# Patient Record
Sex: MALE | Race: WHITE | NOT HISPANIC OR LATINO | Employment: OTHER | URBAN - NONMETROPOLITAN AREA
[De-identification: names, ages, dates, MRNs, and addresses within clinical notes are randomized per-mention and may not be internally consistent; named-entity substitution may affect disease eponyms.]

---

## 2021-03-19 ENCOUNTER — HOSPITAL ENCOUNTER (EMERGENCY)
Facility: HOSPITAL | Age: 81
End: 2021-03-20
Attending: EMERGENCY MEDICINE | Admitting: EMERGENCY MEDICINE
Payer: MEDICARE

## 2021-03-19 ENCOUNTER — APPOINTMENT (EMERGENCY)
Dept: CT IMAGING | Facility: HOSPITAL | Age: 81
End: 2021-03-19
Payer: MEDICARE

## 2021-03-19 DIAGNOSIS — N20.1 LEFT URETERAL CALCULUS: Primary | ICD-10-CM

## 2021-03-19 DIAGNOSIS — K57.90 DIVERTICULOSIS: ICD-10-CM

## 2021-03-19 DIAGNOSIS — N40.0 ENLARGED PROSTATE: ICD-10-CM

## 2021-03-19 DIAGNOSIS — I10 HYPERTENSION: ICD-10-CM

## 2021-03-19 DIAGNOSIS — N18.30 CKD (CHRONIC KIDNEY DISEASE) STAGE 3, GFR 30-59 ML/MIN (HCC): ICD-10-CM

## 2021-03-19 DIAGNOSIS — N13.30 HYDROURETERONEPHROSIS: ICD-10-CM

## 2021-03-19 LAB
ALBUMIN SERPL BCP-MCNC: 4.2 G/DL (ref 3.5–5)
ALP SERPL-CCNC: 76 U/L (ref 46–116)
ALT SERPL W P-5'-P-CCNC: 28 U/L (ref 12–78)
ANION GAP SERPL CALCULATED.3IONS-SCNC: 10 MMOL/L (ref 4–13)
APTT PPP: 26 SECONDS (ref 23–37)
AST SERPL W P-5'-P-CCNC: 21 U/L (ref 5–45)
BACTERIA UR QL AUTO: NORMAL /HPF
BASOPHILS # BLD AUTO: 0.01 THOUSANDS/ΜL (ref 0–0.1)
BASOPHILS NFR BLD AUTO: 0 % (ref 0–1)
BILIRUB SERPL-MCNC: 0.4 MG/DL (ref 0.2–1)
BILIRUB UR QL STRIP: NEGATIVE
BUN SERPL-MCNC: 28 MG/DL (ref 5–25)
CALCIUM SERPL-MCNC: 9.2 MG/DL (ref 8.3–10.1)
CHLORIDE SERPL-SCNC: 105 MMOL/L (ref 100–108)
CLARITY UR: CLEAR
CO2 SERPL-SCNC: 25 MMOL/L (ref 21–32)
COLOR UR: YELLOW
CREAT SERPL-MCNC: 1.5 MG/DL (ref 0.6–1.3)
D DIMER PPP FEU-MCNC: 0.77 UG/ML FEU
EOSINOPHIL # BLD AUTO: 0.02 THOUSAND/ΜL (ref 0–0.61)
EOSINOPHIL NFR BLD AUTO: 0 % (ref 0–6)
ERYTHROCYTE [DISTWIDTH] IN BLOOD BY AUTOMATED COUNT: 13.3 % (ref 11.6–15.1)
GFR SERPL CREATININE-BSD FRML MDRD: 44 ML/MIN/1.73SQ M
GLUCOSE SERPL-MCNC: 148 MG/DL (ref 65–140)
GLUCOSE UR STRIP-MCNC: NEGATIVE MG/DL
HCT VFR BLD AUTO: 38.4 % (ref 36.5–49.3)
HGB BLD-MCNC: 12.4 G/DL (ref 12–17)
HGB UR QL STRIP.AUTO: ABNORMAL
IMM GRANULOCYTES # BLD AUTO: 0.03 THOUSAND/UL (ref 0–0.2)
IMM GRANULOCYTES NFR BLD AUTO: 0 % (ref 0–2)
INR PPP: 1.02 (ref 0.84–1.19)
KETONES UR STRIP-MCNC: NEGATIVE MG/DL
LACTATE SERPL-SCNC: 0.7 MMOL/L (ref 0.5–2)
LEUKOCYTE ESTERASE UR QL STRIP: NEGATIVE
LIPASE SERPL-CCNC: 94 U/L (ref 73–393)
LYMPHOCYTES # BLD AUTO: 1.06 THOUSANDS/ΜL (ref 0.6–4.47)
LYMPHOCYTES NFR BLD AUTO: 12 % (ref 14–44)
MCH RBC QN AUTO: 30.7 PG (ref 26.8–34.3)
MCHC RBC AUTO-ENTMCNC: 32.3 G/DL (ref 31.4–37.4)
MCV RBC AUTO: 95 FL (ref 82–98)
MONOCYTES # BLD AUTO: 0.58 THOUSAND/ΜL (ref 0.17–1.22)
MONOCYTES NFR BLD AUTO: 7 % (ref 4–12)
NEUTROPHILS # BLD AUTO: 6.94 THOUSANDS/ΜL (ref 1.85–7.62)
NEUTS SEG NFR BLD AUTO: 81 % (ref 43–75)
NITRITE UR QL STRIP: NEGATIVE
NON-SQ EPI CELLS URNS QL MICRO: NORMAL /HPF
NRBC BLD AUTO-RTO: 0 /100 WBCS
PH UR STRIP.AUTO: 5.5 [PH]
PLATELET # BLD AUTO: 184 THOUSANDS/UL (ref 149–390)
PMV BLD AUTO: 11 FL (ref 8.9–12.7)
POTASSIUM SERPL-SCNC: 4.4 MMOL/L (ref 3.5–5.3)
PROT SERPL-MCNC: 7.6 G/DL (ref 6.4–8.2)
PROT UR STRIP-MCNC: NEGATIVE MG/DL
PROTHROMBIN TIME: 13.2 SECONDS (ref 11.6–14.5)
RBC # BLD AUTO: 4.04 MILLION/UL (ref 3.88–5.62)
RBC #/AREA URNS AUTO: NORMAL /HPF
SODIUM SERPL-SCNC: 140 MMOL/L (ref 136–145)
SP GR UR STRIP.AUTO: 1.02 (ref 1–1.03)
TROPONIN I SERPL-MCNC: <0.02 NG/ML
UROBILINOGEN UR QL STRIP.AUTO: 0.2 E.U./DL
WBC # BLD AUTO: 8.64 THOUSAND/UL (ref 4.31–10.16)
WBC #/AREA URNS AUTO: NORMAL /HPF

## 2021-03-19 PROCEDURE — 80053 COMPREHEN METABOLIC PANEL: CPT | Performed by: PHYSICIAN ASSISTANT

## 2021-03-19 PROCEDURE — 84484 ASSAY OF TROPONIN QUANT: CPT | Performed by: PHYSICIAN ASSISTANT

## 2021-03-19 PROCEDURE — 36415 COLL VENOUS BLD VENIPUNCTURE: CPT | Performed by: PHYSICIAN ASSISTANT

## 2021-03-19 PROCEDURE — 96374 THER/PROPH/DIAG INJ IV PUSH: CPT

## 2021-03-19 PROCEDURE — 81001 URINALYSIS AUTO W/SCOPE: CPT | Performed by: PHYSICIAN ASSISTANT

## 2021-03-19 PROCEDURE — 93005 ELECTROCARDIOGRAM TRACING: CPT

## 2021-03-19 PROCEDURE — 83605 ASSAY OF LACTIC ACID: CPT | Performed by: PHYSICIAN ASSISTANT

## 2021-03-19 PROCEDURE — 85379 FIBRIN DEGRADATION QUANT: CPT | Performed by: PHYSICIAN ASSISTANT

## 2021-03-19 PROCEDURE — 96376 TX/PRO/DX INJ SAME DRUG ADON: CPT

## 2021-03-19 PROCEDURE — 99285 EMERGENCY DEPT VISIT HI MDM: CPT

## 2021-03-19 PROCEDURE — 85730 THROMBOPLASTIN TIME PARTIAL: CPT | Performed by: PHYSICIAN ASSISTANT

## 2021-03-19 PROCEDURE — G1004 CDSM NDSC: HCPCS

## 2021-03-19 PROCEDURE — 96375 TX/PRO/DX INJ NEW DRUG ADDON: CPT

## 2021-03-19 PROCEDURE — 83690 ASSAY OF LIPASE: CPT | Performed by: PHYSICIAN ASSISTANT

## 2021-03-19 PROCEDURE — 74176 CT ABD & PELVIS W/O CONTRAST: CPT

## 2021-03-19 PROCEDURE — 99285 EMERGENCY DEPT VISIT HI MDM: CPT | Performed by: PHYSICIAN ASSISTANT

## 2021-03-19 PROCEDURE — 85025 COMPLETE CBC W/AUTO DIFF WBC: CPT | Performed by: PHYSICIAN ASSISTANT

## 2021-03-19 PROCEDURE — 85610 PROTHROMBIN TIME: CPT | Performed by: PHYSICIAN ASSISTANT

## 2021-03-19 RX ORDER — MORPHINE SULFATE 4 MG/ML
4 INJECTION, SOLUTION INTRAMUSCULAR; INTRAVENOUS ONCE
Status: COMPLETED | OUTPATIENT
Start: 2021-03-19 | End: 2021-03-19

## 2021-03-19 RX ORDER — HYDROMORPHONE HCL/PF 1 MG/ML
0.5 SYRINGE (ML) INJECTION ONCE
Status: COMPLETED | OUTPATIENT
Start: 2021-03-19 | End: 2021-03-19

## 2021-03-19 RX ORDER — SILODOSIN 8 MG/1
CAPSULE ORAL
COMMUNITY

## 2021-03-19 RX ORDER — OMEPRAZOLE 20 MG/1
20 CAPSULE, DELAYED RELEASE ORAL DAILY
COMMUNITY

## 2021-03-19 RX ORDER — LOSARTAN POTASSIUM 25 MG/1
25 TABLET ORAL DAILY
COMMUNITY

## 2021-03-19 RX ORDER — MELATONIN
1000 DAILY
COMMUNITY

## 2021-03-19 RX ORDER — ACETAMINOPHEN 325 MG/1
650 TABLET ORAL ONCE
Status: COMPLETED | OUTPATIENT
Start: 2021-03-19 | End: 2021-03-19

## 2021-03-19 RX ORDER — HYDROMORPHONE HCL/PF 1 MG/ML
0.5 SYRINGE (ML) INJECTION EVERY 2 HOUR PRN
Status: COMPLETED | OUTPATIENT
Start: 2021-03-19 | End: 2021-03-20

## 2021-03-19 RX ORDER — ONDANSETRON 2 MG/ML
4 INJECTION INTRAMUSCULAR; INTRAVENOUS ONCE
Status: COMPLETED | OUTPATIENT
Start: 2021-03-19 | End: 2021-03-19

## 2021-03-19 RX ORDER — TADALAFIL 5 MG/1
5 TABLET ORAL DAILY PRN
COMMUNITY

## 2021-03-19 RX ORDER — CEFAZOLIN SODIUM 2 G/50ML
2000 SOLUTION INTRAVENOUS
Status: DISCONTINUED | OUTPATIENT
Start: 2021-03-20 | End: 2021-03-20 | Stop reason: HOSPADM

## 2021-03-19 RX ORDER — UBIDECARENONE 75 MG
CAPSULE ORAL DAILY
COMMUNITY

## 2021-03-19 RX ADMIN — HYDROMORPHONE HYDROCHLORIDE 0.5 MG: 1 INJECTION, SOLUTION INTRAMUSCULAR; INTRAVENOUS; SUBCUTANEOUS at 18:34

## 2021-03-19 RX ADMIN — ACETAMINOPHEN 650 MG: 325 TABLET, FILM COATED ORAL at 15:08

## 2021-03-19 RX ADMIN — MORPHINE SULFATE 4 MG: 4 INJECTION INTRAVENOUS at 17:21

## 2021-03-19 RX ADMIN — HYDROMORPHONE HYDROCHLORIDE 0.5 MG: 1 INJECTION, SOLUTION INTRAMUSCULAR; INTRAVENOUS; SUBCUTANEOUS at 21:06

## 2021-03-19 RX ADMIN — ONDANSETRON 4 MG: 2 INJECTION INTRAMUSCULAR; INTRAVENOUS at 17:21

## 2021-03-19 NOTE — ED NOTES
LEYDA Johnson made aware of patient's blood pressure 197/84     Maria Eugenia Gutierrez, RN  03/19/21 1389

## 2021-03-19 NOTE — ED NOTES
Pt agreeable to take iv pain medication  Provider aware       Terrence Virgen, JANICE  03/19/21 2895

## 2021-03-19 NOTE — ED NOTES
Patient is aware that  time is for 20:30  Something for pain was also ordered        Shea Salinas, RN  03/19/21 5193

## 2021-03-19 NOTE — ED PROVIDER NOTES
History  Chief Complaint   Patient presents with    Abdominal Pain     Having left sided abdominal pain/rib pain since about 12:00 no N/V/D    Flank Pain     Woke up with left sided flank pain this morning  Has hx of kidney stones  68year old male presents ambulatory from home for evaluation of flank and abdominal pain  Pt notes he woke up today with pain in his left flank  He notes since around noon this pain has radiated around his side to his left upper abdomen  He reports pain under his ribs on the left  Denies chest pain or SOB but notes the pain takes his breath away when it gets intense  It has been present since onset but varies in intensity  Denies fever, chills, cough or congestion  Denies N/V/D  Admits to some recent constipation  Denies frequency, urgency, dysuria, hematuria  Pt notes this pain feels similar to prior kidney stones  No reported aggravating or alleviating factors  At times pain becomes very intense  No specific treatments tried  PMH includes CAD, HTN and kidney stones        History provided by:  Patient   used: No    Abdominal Pain  Pain location:  L flank  Pain quality: aching    Pain radiates to:  LUQ  Onset quality:  Sudden  Timing:  Intermittent  Chronicity:  New  Context: not eating, not recent illness, not recent travel, not retching, not sick contacts, not suspicious food intake and not trauma    Relieved by:  None tried  Worsened by:  Nothing  Associated symptoms: no chest pain, no chills, no constipation, no cough, no diarrhea, no dysuria, no fatigue, no fever, no hematuria, no nausea, no shortness of breath, no sore throat and no vomiting    Risk factors: being elderly    Risk factors: has not had multiple surgeries and no recent hospitalization    Flank Pain  Associated symptoms: no chest pain, no chills, no constipation, no cough, no diarrhea, no dysuria, no fatigue, no fever, no hematuria, no nausea, no shortness of breath, no sore throat and no vomiting        Prior to Admission Medications   Prescriptions Last Dose Informant Patient Reported? Taking? Silodosin (Rapaflo) 8 MG CAPS 3/19/2021 at Unknown time  Yes Yes   Sig: Take by mouth   aspirin (ECOTRIN) 325 mg EC tablet 3/19/2021 at Unknown time  Yes Yes   Sig: Take 325 mg by mouth every 6 (six) hours as needed for mild pain   cholecalciferol (VITAMIN D3) 1,000 units tablet 3/19/2021 at Unknown time  Yes Yes   Sig: Take 1,000 Units by mouth daily   cyanocobalamin (VITAMIN B-12) 100 mcg tablet 3/19/2021 at Unknown time  Yes Yes   Sig: Take by mouth daily   losartan (COZAAR) 25 mg tablet 3/19/2021 at Unknown time  Yes Yes   Sig: Take 25 mg by mouth daily   omeprazole (PriLOSEC) 20 mg delayed release capsule 3/19/2021 at Unknown time  Yes Yes   Sig: Take 20 mg by mouth daily   tadalafil (CIALIS) 5 MG tablet 3/18/2021 at Unknown time  Yes Yes   Sig: Take 5 mg by mouth daily as needed for erectile dysfunction      Facility-Administered Medications: None       Past Medical History:   Diagnosis Date    Coronary artery disease     Hypertension     Kidney stones        Past Surgical History:   Procedure Laterality Date    CARDIAC SURGERY      CHOLECYSTECTOMY      CORONARY ARTERY BYPASS GRAFT      TONSILLECTOMY         History reviewed  No pertinent family history  I have reviewed and agree with the history as documented  E-Cigarette/Vaping    E-Cigarette Use Never User      E-Cigarette/Vaping Substances     Social History     Tobacco Use    Smoking status: Never Smoker    Smokeless tobacco: Never Used   Substance Use Topics    Alcohol use: Yes    Drug use: Never       Review of Systems   Constitutional: Negative  Negative for chills, fatigue and fever  HENT: Negative  Negative for congestion, rhinorrhea and sore throat  Eyes: Negative  Negative for visual disturbance  Respiratory: Negative  Negative for cough, shortness of breath and wheezing      Cardiovascular: Negative  Negative for chest pain, palpitations and leg swelling  Gastrointestinal: Positive for abdominal pain  Negative for constipation, diarrhea, nausea and vomiting  Genitourinary: Positive for flank pain  Negative for decreased urine volume, dysuria, frequency and hematuria  Musculoskeletal: Positive for back pain  Negative for myalgias and neck pain  Skin: Negative  Negative for rash  Neurological: Negative  Negative for dizziness, light-headedness, numbness and headaches  Psychiatric/Behavioral: Negative  Negative for confusion  All other systems reviewed and are negative  Physical Exam  Physical Exam  Vitals signs and nursing note reviewed  Constitutional:       General: He is not in acute distress  Appearance: Normal appearance  He is well-developed  He is not toxic-appearing or diaphoretic  HENT:      Head: Normocephalic and atraumatic  Right Ear: Hearing and external ear normal       Left Ear: Hearing and external ear normal       Nose: Nose normal       Mouth/Throat:      Pharynx: Oropharynx is clear  Uvula midline  No oropharyngeal exudate  Eyes:      General: No scleral icterus  Extraocular Movements: Extraocular movements intact  Conjunctiva/sclera: Conjunctivae normal       Pupils: Pupils are equal, round, and reactive to light  Neck:      Musculoskeletal: Normal range of motion and neck supple  Trachea: Trachea and phonation normal  No tracheal deviation  Cardiovascular:      Rate and Rhythm: Normal rate and regular rhythm  Pulses: Normal pulses  Heart sounds: Normal heart sounds, S1 normal and S2 normal  No murmur  Pulmonary:      Effort: Pulmonary effort is normal  No tachypnea or respiratory distress  Breath sounds: Normal breath sounds  No wheezing, rhonchi or rales  Abdominal:      General: Bowel sounds are normal  There is no distension  Palpations: Abdomen is soft  Tenderness:  There is abdominal tenderness in the left upper quadrant  There is no right CVA tenderness, left CVA tenderness, guarding or rebound  Hernia: No hernia is present  Musculoskeletal: Normal range of motion  General: No tenderness  Right lower leg: No edema  Left lower leg: No edema  Skin:     General: Skin is warm and dry  Capillary Refill: Capillary refill takes less than 2 seconds  Findings: No rash  Neurological:      General: No focal deficit present  Mental Status: He is alert and oriented to person, place, and time  GCS: GCS eye subscore is 4  GCS verbal subscore is 5  GCS motor subscore is 6  Cranial Nerves: No cranial nerve deficit  Sensory: No sensory deficit  Motor: No abnormal muscle tone        Gait: Gait normal    Psychiatric:         Mood and Affect: Mood normal          Speech: Speech normal          Behavior: Behavior normal          Vital Signs  ED Triage Vitals [03/19/21 1417]   Temperature Pulse Respirations Blood Pressure SpO2   97 5 °F (36 4 °C) 62 16 (!) 187/82 98 %      Temp Source Heart Rate Source Patient Position - Orthostatic VS BP Location FiO2 (%)   Temporal Monitor Sitting Right arm --      Pain Score       8           Vitals:    03/19/21 1930 03/19/21 1940 03/19/21 2000 03/19/21 2030   BP:  168/77 154/72 (!) 175/70   Pulse: 70 66 70 60   Patient Position - Orthostatic VS:             Visual Acuity      ED Medications  Medications   ceFAZolin (ANCEF) IVPB (premix in dextrose) 2,000 mg 50 mL (has no administration in time range)   HYDROmorphone (DILAUDID) injection 0 5 mg (has no administration in time range)   ondansetron (ZOFRAN) injection 4 mg (4 mg Intravenous Given 3/19/21 1721)   morphine (PF) 4 mg/mL injection 4 mg (4 mg Intravenous Given 3/19/21 1721)   acetaminophen (TYLENOL) tablet 650 mg (650 mg Oral Given 3/19/21 1508)   HYDROmorphone (DILAUDID) injection 0 5 mg (0 5 mg Intravenous Given 3/19/21 1834)       Diagnostic Studies  Results Reviewed Procedure Component Value Units Date/Time    Urine Microscopic [[id number]]  (Normal) Collected: 03/19/21 1553    Lab Status: Final result Specimen: Urine, Clean Catch Updated: 03/19/21 1618     RBC, UA 2-4 /hpf      WBC, UA None Seen /hpf      Epithelial Cells Occasional /hpf      Bacteria, UA Occasional /hpf     UA (URINE) with reflex to Scope [[id number]]  (Abnormal) Collected: 03/19/21 1553    Lab Status: Final result Specimen: Urine, Clean Catch Updated: 03/19/21 1559     Color, UA Yellow     Clarity, UA Clear     Specific Gravity, UA 1 025     pH, UA 5 5     Leukocytes, UA Negative     Nitrite, UA Negative     Protein, UA Negative mg/dl      Glucose, UA Negative mg/dl      Ketones, UA Negative mg/dl      Urobilinogen, UA 0 2 E U /dl      Bilirubin, UA Negative     Blood, UA Small    Troponin I [[id number]]  (Normal) Collected: 03/19/21 1432    Lab Status: Final result Specimen: Blood from Arm, Right Updated: 03/19/21 1457     Troponin I <0 02 ng/mL     Lactic acid [[id number]]  (Normal) Collected: 03/19/21 1432    Lab Status: Final result Specimen: Blood from Arm, Right Updated: 03/19/21 1457     LACTIC ACID 0 7 mmol/L     Narrative:      Result may be elevated if tourniquet was used during collection      D-Dimer [[id number]]  (Abnormal) Collected: 03/19/21 1432    Lab Status: Final result Specimen: Blood from Arm, Right Updated: 03/19/21 1456     D-Dimer, Quant 0 77 ug/ml FEU     Comprehensive metabolic panel [[id number]]  (Abnormal) Collected: 03/19/21 1432    Lab Status: Final result Specimen: Blood from Arm, Right Updated: 03/19/21 1455     Sodium 140 mmol/L      Potassium 4 4 mmol/L      Chloride 105 mmol/L      CO2 25 mmol/L      ANION GAP 10 mmol/L      BUN 28 mg/dL      Creatinine 1 50 mg/dL      Glucose 148 mg/dL      Calcium 9 2 mg/dL      AST 21 U/L      ALT 28 U/L      Alkaline Phosphatase 76 U/L      Total Protein 7 6 g/dL      Albumin 4 2 g/dL      Total Bilirubin 0 40 mg/dL      eGFR 44 ml/min/1 73sq m     Narrative:      National Kidney Disease Foundation guidelines for Chronic Kidney Disease (CKD):     Stage 1 with normal or high GFR (GFR > 90 mL/min/1 73 square meters)    Stage 2 Mild CKD (GFR = 60-89 mL/min/1 73 square meters)    Stage 3A Moderate CKD (GFR = 45-59 mL/min/1 73 square meters)    Stage 3B Moderate CKD (GFR = 30-44 mL/min/1 73 square meters)    Stage 4 Severe CKD (GFR = 15-29 mL/min/1 73 square meters)    Stage 5 End Stage CKD (GFR <15 mL/min/1 73 square meters)  Note: GFR calculation is accurate only with a steady state creatinine    Lipase [[id number]]  (Normal) Collected: 03/19/21 1432    Lab Status: Final result Specimen: Blood from Arm, Right Updated: 03/19/21 1448     Lipase 94 u/L     Protime-INR [[id number]]  (Normal) Collected: 03/19/21 1432    Lab Status: Final result Specimen: Blood from Arm, Right Updated: 03/19/21 1447     Protime 13 2 seconds      INR 1 02    APTT [[id number]]  (Normal) Collected: 03/19/21 1432    Lab Status: Final result Specimen: Blood from Arm, Right Updated: 03/19/21 1447     PTT 26 seconds     CBC and differential [[id number]]  (Abnormal) Collected: 03/19/21 1432    Lab Status: Final result Specimen: Blood from Arm, Right Updated: 03/19/21 1439     WBC 8 64 Thousand/uL      RBC 4 04 Million/uL      Hemoglobin 12 4 g/dL      Hematocrit 38 4 %      MCV 95 fL      MCH 30 7 pg      MCHC 32 3 g/dL      RDW 13 3 %      MPV 11 0 fL      Platelets 413 Thousands/uL      nRBC 0 /100 WBCs      Neutrophils Relative 81 %      Immat GRANS % 0 %      Lymphocytes Relative 12 %      Monocytes Relative 7 %      Eosinophils Relative 0 %      Basophils Relative 0 %      Neutrophils Absolute 6 94 Thousands/µL      Immature Grans Absolute 0 03 Thousand/uL      Lymphocytes Absolute 1 06 Thousands/µL      Monocytes Absolute 0 58 Thousand/µL      Eosinophils Absolute 0 02 Thousand/µL      Basophils Absolute 0 01 Thousands/µL                  CT renal stone study abdomen pelvis without contrast   Final Result by Ray Kline MD (03/19 9006)      Proximal left obstructing 1 4 cm ureteral calculus just beyond the renal pelvis causing moderate left hydroureteronephrosis  Numerous other subcentimeter left renal calculi are identified largest in the lower pole measuring 8 mm  Left renal simple attenuating cyst and left perinephric stranding could represent edema rather than infection, correlate with urinalysis to exclude urinary tract infection  Enlarged prostate protruding into the bladder base  Colonic diverticulosis  Workstation performed: [id number]                    Procedures  ECG 12 Lead Documentation Only    Date/Time: 3/19/2021 2:36 PM  Performed by: Suresh Rao PA-C  Authorized by: Suresh Rao PA-C     Indications / Diagnosis:  Abd pain  ECG reviewed by me, the ED Provider: yes    Patient location:  ED  Previous ECG:     Previous ECG:  Unavailable    Comparison to cardiac monitor: Yes    Interpretation:     Interpretation: abnormal    Rate:     ECG rate:  60    ECG rate assessment: normal    Rhythm:     Rhythm: sinus rhythm    Ectopy:     Ectopy: none    QRS:     QRS axis:  Normal    QRS intervals:  Normal  Conduction:     Conduction: normal    ST segments:     ST segments:  Normal  T waves:     T waves: non-specific and inverted      Inverted:  III and V1  Comments:      , QRS 82, QT/QTc 400/400; no acute ischemia  ED Course  ED Course as of Mar 19 2100   Fri Mar 19, 2021   1436 Pt declines the morphine that was ordered and questions if he can have something else such as aspirin or tylenol        1439 WBC: 8 64   1439 Hemoglobin: 12 4   1439 Platelet Count: 928   1458 Glucose, Random(!): 148   1458 Similar to prior outpatient labs in Dec 2020 (1 51)   Creatinine(!): 1 50   1459 BUN(!): 28   1459 Sodium: 140   1459 Potassium: 4 4   1459 Chloride: 105   1459 CO2: 25   1459 Anion Gap: 10   1459 Calcium: 9 2 1459 AST: 21   1459 ALT: 28   1459 Alkaline Phosphatase: 76   1459 Total Protein: 7 6   1459 Albumin: 4 2   1459 TOTAL BILIRUBIN: 0 40   1459 eGFR: 44   1459 INR: 1 02   1459 Protime: 13 2   1459 PTT: 26   1459 Lipase: 94   1459 Troponin I: <0 02   1459 LACTIC ACID: 0 7   1459 Age corrected is within normal limits  D-Dimer, Quant(!): 0 77   1529 CT performed and pending interpretation  1547 IMPRESSION:     Proximal left obstructing 1 4 cm ureteral calculus just beyond the renal pelvis causing moderate left hydroureteronephrosis  Numerous other subcentimeter left renal calculi are identified largest in the lower pole measuring 8 mm        Left renal simple attenuating cyst and left perinephric stranding could represent edema rather than infection, correlate with urinalysis to exclude urinary tract infection      Enlarged prostate protruding into the bladder base      Colonic diverticulosis  CT renal stone study abdomen pelvis without contrast   1555 Tiger text sent to on call urology Snehal Uribe to discuss  36 Spoke with pt and family  Requests that I call his urologist that he follows with in [place]  [id number] Phone # [phone]  Dr Magi Vora      1600 Called placed through answering service [facility] urology  Will page out to his physician Dr Magi Vora  1600 [facility] J small blood but otherwise no evidence of infection  UA (URINE) with reflex to Scope(!)   1630 WBC, UA: None Seen   1630 RBC, UA: 2-4   1640 I still have not heard back from pt's urologist whom was paged through his answering service  1650 I again spoke with pt  He reports his pain is getting worse but declines the morphine that was ordered as he doesn't want anything that strong  I did discuss that I still haven't heard from his specialist and asked him how he would like to proceed  He doesn't feel he could go home feeling like this and is agreeable to transfer to [facility] for urology consult and intervention  760 [facility] [place] transfer request       8079 Again spoke to urology NP  Will add on scheduled for tomorrow  Recommends transfer to [facility]  56 Pt now agreeable to the morphine that was previously ordered  1735 Received return call from PACS; spoke with Dr Gillespie Side of [facility], appropriate pt information relayed and accepted in transfer  1751 ETA  for transport scheduled for 8:30 pm       1819 A resident working with Dr Laurent Champ returned phone call  I relayed information and plan for transfer which he agrees is appropriate to have pt acutely taken care of  He asked to speak to pt and we were able to connect him via telephone  1828 Pt reports pain returning  Currently rates it 12/10  Is agreeable to additional medication  1854 Pt and spouse agreeable to transfer/admission plan  1921 Received call from PACS  ETA for  has been pushed back to 2245 1951 Pt has had elevated BP readings here, however did improve with treatment of his pain  He remains comfortable and latest /77       2033 Received another call from PACS regarding transportation   time now 00:30  Pt care endorsed to on coming night time physician Dr Heart Screen due to shift change  Pt is pending transfer to [facility] for urological intervention which is planned tomorrow  I have order additional pain medication which can be given as needed  Pt afebrile and hemodynamically stable at time of my departure        HEART Risk Score      Most Recent Value   Heart Score Risk Calculator   History  0 Filed at: 03/19/2021 1440   ECG  1 Filed at: 03/19/2021 1440   Age  2 Filed at: 03/19/2021 1440   Risk Factors  2 Filed at: 03/19/2021 1440   Troponin  0 Filed at: 03/19/2021 1440   HEART Score  5 Filed at: 03/19/2021 1440                      SBIRT 20yo+      Most Recent Value   SBIRT (25 yo +)   In order to provide better care to our patients, we are screening all of our patients for alcohol and drug use  Would it be okay to ask you these screening questions? Yes Filed at: 03/19/2021 1423   Initial Alcohol Screen: US AUDIT-C    1  How often do you have a drink containing alcohol?  0 Filed at: 03/19/2021 1423   2  How many drinks containing alcohol do you have on a typical day you are drinking? 0 Filed at: 03/19/2021 1423   3a  Male UNDER 65: How often do you have five or more drinks on one occasion? 0 Filed at: 03/19/2021 1423   3b  FEMALE Any Age, or MALE 65+: How often do you have 4 or more drinks on one occassion? 0 Filed at: 03/19/2021 1423   Audit-C Score  0 Filed at: 03/19/2021 1423   GODWIN: How many times in the past year have you    Used an illegal drug or used a prescription medication for non-medical reasons?   Never Filed at: 03/19/2021 1423          Wells' Criteria for PE      Most Recent Value   Wells' Criteria for PE   Clinical signs and symptoms of DVT  0 Filed at: 03/19/2021 1458   PE is primary diagnosis or equally likely  0 Filed at: 03/19/2021 1458   HR >100  0 Filed at: 03/19/2021 1458   Immobilization at least 3 days or Surgery in the previous 4 weeks  0 Filed at: 03/19/2021 1458   Previous, objectively diagnosed PE or DVT  0 Filed at: 03/19/2021 1458   Hemoptysis  0 Filed at: 03/19/2021 1458   Malignancy with treatment within 6 months or palliative  0 Filed at: 03/19/2021 1458   Wells' Criteria Total  0 Filed at: 03/19/2021 1458                MDM  Number of Diagnoses or Management Options  CKD (chronic kidney disease) stage 3, GFR 30-59 ml/min:   Diverticulosis:   Enlarged prostate:   Hydroureteronephrosis: new and requires workup  Hypertension: established and worsening  Left ureteral calculus: new and requires workup     Amount and/or Complexity of Data Reviewed  Clinical lab tests: ordered and reviewed  Tests in the radiology section of CPT®: ordered and reviewed  Decide to obtain previous medical records or to obtain history from someone other than the patient: yes  Obtain history from someone other than the patient: yes  Review and summarize past medical records: yes  Discuss the patient with other providers: yes (Attending, SLIM, urology)  Independent visualization of images, tracings, or specimens: yes    Patient Progress  Patient progress: improved      Disposition  Final diagnoses:   Left ureteral calculus   Hydroureteronephrosis   CKD (chronic kidney disease) stage 3, GFR 30-59 ml/min   Enlarged prostate   Diverticulosis   Hypertension     Time reflects when diagnosis was documented in both MDM as applicable and the Disposition within this note     Time User Action Codes Description Comment    3/19/2021  4:11 PM Terry [place] Add [N20 1] Left ureteral calculus     3/19/2021  4:12 PM Terry [place] Add [N13 30] Hydroureteronephrosis     3/19/2021  4:12 PM Terry [place] Add [N18 30] CKD (chronic kidney disease) stage 3, GFR 30-59 ml/min     3/19/2021  4:13 PM Terry [place] Add [N40 0] Enlarged prostate     3/19/2021  4:13 PM Caroline Menjivar [K57 90] Diverticulosis     3/19/2021  5:11 PM Junior 83Angélica [address] Hypertension       ED Disposition     ED Disposition Condition Date/Time Comment    Transfer to Another Facility-In Network  Fri Mar 19, 2021  5:11 PM Nile Sers should be transferred out to [facility]          MD Documentation      Most Recent Value   Patient Condition  The patient has been stabilized such that within reasonable medical probability, no material deterioration of the patient condition or the condition of the unborn child(kevni) is likely to result from the transfer   Reason for Transfer  Level of Care needed not available at this facility   Benefits of Transfer  Specialized equipment and/or services available at the receiving facility (Include comment)________________________ [urology]   Risks of Transfer  Potential for delay in receiving treatment, Loss of IV, Potential deterioration of medical condition, Increased discomfort during transfer, Possible worsening of condition or death during transfer   Accepting Physician  [address] Name, [address] [place] [facility]    (Name & Tel number)  PACS   Sending MD COLLETTE CRABTREE   Provider Certification  General risk, such as traffic hazards, adverse weather conditions, rough terrain or turbulence, possible failure of equipment (including vehicle or aircraft), or consequences of actions of persons outside the control of the transport personnel, Unanticipated needs of medical equipment and personnel during transport, The possibility of a transport vehicle being unavailable, Risk of worsening condition      RN Documentation      [address] Name, [address] [place] Fermin [facility]   Bed Assignment  [id number]    (Name & Tel number)  PACS      Follow-up Information    None         Patient's Medications   Discharge Prescriptions    No medications on file     No discharge procedures on file      PDMP Review       Value Time User    PDMP Reviewed  Yes 3/19/2021  6:28 PM Sam Chapin PA-C          ED Provider  Electronically Signed by           Sam Chapin PA-C  03/19/21 2100

## 2021-03-19 NOTE — ED NOTES
Pt returned from [address] [place] [address] 60 at this time     August Daugherty RN  03/19/21 7608

## 2021-03-19 NOTE — ED NOTES
KAUSHAL new  time is 22:00  Parveen Schaeffer PA-C aware        Deyvi Redmond, JANICE  03/19/21 8210

## 2021-03-19 NOTE — EMTALA/ACUTE CARE TRANSFER
[address]  [address] [place] [id number]  Dept: [phone]      EMTALA TRANSFER CONSENT    NAME Amarilis Ivory 1940                              MRN [id number]    I have been informed of my rights regarding examination, treatment, and transfer   by Dr Nemesio Klein No att  providers found    Benefits: Specialized equipment and/or services available at the receiving facility (Include comment)________________________(urology)    Risks: Potential for delay in receiving treatment, Loss of IV, Potential deterioration of medical condition, Increased discomfort during transfer, Possible worsening of condition or death during transfer      Consent for Transfer:  I acknowledge that my medical condition has been evaluated and explained to me by the emergency department physician or other qualified medical person and/or my attending physician, who has recommended that I be transferred to the service of  Accepting Physician: Dr Tanja Iglesias at [address] [facility] Name, [address] : [facility]  The above potential benefits of such transfer, the potential risks associated with such transfer, and the probable risks of not being transferred have been explained to me, and I fully understand them  The doctor has explained that, in my case, the benefits of transfer outweigh the risks  I agree to be transferred  I authorize the performance of emergency medical procedures and treatments upon me in both transit and upon arrival at the receiving facility  Additionally, I authorize the release of any and all medical records to the receiving facility and request they be transported with me, if possible  I understand that the safest mode of transportation during a medical emergency is an ambulance and that the Hospital advocates the use of this mode of transport   Risks of traveling to the receiving facility by car, including absence of medical control, life sustaining equipment, such as oxygen, and medical personnel has been explained to me and I fully understand them  (KATLIN CORRECT BOX BELOW)  [ x ]  I consent to the stated transfer and to be transported by ambulance/helicopter  [  ]  I consent to the stated transfer, but refuse transportation by ambulance and accept full responsibility for my transportation by car  I understand the risks of non-ambulance transfers and I exonerate the Hospital and its staff from any deterioration in my condition that results from this refusal     X___________________________________________    DATE  21  TIME________  Signature of patient or legally responsible individual signing on patient behalf           RELATIONSHIP TO PATIENT_________________________          Provider Certification    NAME Des Humphrey                                        [facility] 1940                              MRN [id number]    A medical screening exam was performed on the above named patient  Based on the examination:    Condition Necessitating Transfer The primary encounter diagnosis was Left ureteral calculus  Diagnoses of Hydroureteronephrosis, CKD (chronic kidney disease) stage 3, GFR 30-59 ml/min, Enlarged prostate, Diverticulosis, and Hypertension were also pertinent to this visit      Patient Condition: The patient has been stabilized such that within reasonable medical probability, no material deterioration of the patient condition or the condition of the unborn child(kevin) is likely to result from the transfer    Reason for Transfer: Level of Care needed not available at this facility    Transfer Requirements: [facility]   · Space available and qualified personnel available for treatment as acknowledged by PACS  · Agreed to accept transfer and to provide appropriate medical treatment as acknowledged by       Dr Nava Manriquez  · Appropriate medical records of the examination and treatment of the patient are provided at the time of transfer   [facility], [address] _______  · Transfer will be performed by qualified personnel from    and appropriate transfer equipment as required, including the use of necessary and appropriate life support measures  Provider Certification: I have examined the patient and explained the following risks and benefits of being transferred/refusing transfer to the patient/family:  General risk, such as traffic hazards, adverse weather conditions, rough terrain or turbulence, possible failure of equipment (including vehicle or aircraft), or consequences of actions of persons outside the control of the transport personnel, Unanticipated needs of medical equipment and personnel during transport, The possibility of a transport vehicle being unavailable, Risk of worsening condition      Based on these reasonable risks and benefits to the patient and/or the unborn child(kevin), and based upon the information available at the time of the patients examination, I certify that the medical benefits reasonably to be expected from the provision of appropriate medical treatments at another medical facility outweigh the increasing risks, if any, to the individuals medical condition, and in the case of labor to the unborn child, from effecting the transfer      X____________________________________________ DATE 03/19/21        TIME_______      ORIGINAL - SEND TO MEDICAL RECORDS   COPY - SEND WITH PATIENT DURING TRANSFER

## 2021-03-19 NOTE — ED NOTES
Patient is still having pain  "the medication did not touch the pain " David Fried PA-C aware  Patient was asking about  time as well        Renny Tamayo, JANICE  03/19/21 [id number]

## 2021-03-20 ENCOUNTER — APPOINTMENT (INPATIENT)
Dept: RADIOLOGY | Facility: HOSPITAL | Age: 81
DRG: 661 | End: 2021-03-20
Payer: MEDICARE

## 2021-03-20 ENCOUNTER — PREP FOR PROCEDURE (OUTPATIENT)
Dept: UROLOGY | Facility: AMBULATORY SURGERY CENTER | Age: 81
End: 2021-03-20

## 2021-03-20 ENCOUNTER — HOSPITAL ENCOUNTER (INPATIENT)
Facility: HOSPITAL | Age: 81
LOS: 1 days | Discharge: HOME | DRG: 661 | End: 2021-03-20
Attending: INTERNAL MEDICINE | Admitting: INTERNAL MEDICINE
Payer: MEDICARE

## 2021-03-20 ENCOUNTER — ANESTHESIA EVENT (INPATIENT)
Dept: PERIOP | Facility: HOSPITAL | Age: 81
DRG: 661 | End: 2021-03-20
Payer: MEDICARE

## 2021-03-20 ENCOUNTER — ANESTHESIA (INPATIENT)
Dept: PERIOP | Facility: HOSPITAL | Age: 81
DRG: 661 | End: 2021-03-20
Payer: MEDICARE

## 2021-03-20 VITALS
SYSTOLIC BLOOD PRESSURE: 182 MMHG | BODY MASS INDEX: 28.79 KG/M2 | TEMPERATURE: 97.5 F | HEART RATE: 69 BPM | HEIGHT: 67 IN | RESPIRATION RATE: 19 BRPM | OXYGEN SATURATION: 95 % | DIASTOLIC BLOOD PRESSURE: 85 MMHG | WEIGHT: 183.42 LBS

## 2021-03-20 VITALS
DIASTOLIC BLOOD PRESSURE: 77 MMHG | RESPIRATION RATE: 16 BRPM | HEART RATE: 57 BPM | BODY MASS INDEX: 27.78 KG/M2 | TEMPERATURE: 97.9 F | WEIGHT: 177 LBS | HEIGHT: 67 IN | SYSTOLIC BLOOD PRESSURE: 169 MMHG | OXYGEN SATURATION: 93 %

## 2021-03-20 DIAGNOSIS — N20.1 LEFT URETERAL STONE: ICD-10-CM

## 2021-03-20 DIAGNOSIS — N20.1 LEFT URETERAL CALCULUS: ICD-10-CM

## 2021-03-20 DIAGNOSIS — N20.0 RENAL CALCULI: Primary | ICD-10-CM

## 2021-03-20 DIAGNOSIS — N13.2 HYDRONEPHROSIS WITH URINARY OBSTRUCTION DUE TO URETERAL CALCULUS: Primary | ICD-10-CM

## 2021-03-20 PROBLEM — N18.32 CHRONIC KIDNEY DISEASE, STAGE 3B (HCC): Status: ACTIVE | Noted: 2021-03-20

## 2021-03-20 PROBLEM — I16.0 HYPERTENSIVE URGENCY: Status: ACTIVE | Noted: 2021-03-20

## 2021-03-20 LAB
ANION GAP SERPL CALCULATED.3IONS-SCNC: 5 MMOL/L (ref 4–13)
BUN SERPL-MCNC: 24 MG/DL (ref 5–25)
CALCIUM SERPL-MCNC: 9.3 MG/DL (ref 8.3–10.1)
CHLORIDE SERPL-SCNC: 107 MMOL/L (ref 100–108)
CO2 SERPL-SCNC: 26 MMOL/L (ref 21–32)
CREAT SERPL-MCNC: 1.59 MG/DL (ref 0.6–1.3)
ERYTHROCYTE [DISTWIDTH] IN BLOOD BY AUTOMATED COUNT: 13.8 % (ref 11.6–15.1)
GFR SERPL CREATININE-BSD FRML MDRD: 41 ML/MIN/1.73SQ M
GLUCOSE SERPL-MCNC: 128 MG/DL (ref 65–140)
HCT VFR BLD AUTO: 40.5 % (ref 36.5–49.3)
HGB BLD-MCNC: 13.1 G/DL (ref 12–17)
MCH RBC QN AUTO: 30.8 PG (ref 26.8–34.3)
MCHC RBC AUTO-ENTMCNC: 32.3 G/DL (ref 31.4–37.4)
MCV RBC AUTO: 95 FL (ref 82–98)
PLATELET # BLD AUTO: 188 THOUSANDS/UL (ref 149–390)
PMV BLD AUTO: 11.6 FL (ref 8.9–12.7)
POTASSIUM SERPL-SCNC: 4 MMOL/L (ref 3.5–5.3)
RBC # BLD AUTO: 4.25 MILLION/UL (ref 3.88–5.62)
SODIUM SERPL-SCNC: 138 MMOL/L (ref 136–145)
WBC # BLD AUTO: 9.63 THOUSAND/UL (ref 4.31–10.16)

## 2021-03-20 PROCEDURE — 99236 HOSP IP/OBS SAME DATE HI 85: CPT | Performed by: INTERNAL MEDICINE

## 2021-03-20 PROCEDURE — 0T778DZ DILATION OF LEFT URETER WITH INTRALUMINAL DEVICE, VIA NATURAL OR ARTIFICIAL OPENING ENDOSCOPIC: ICD-10-PCS | Performed by: UROLOGY

## 2021-03-20 PROCEDURE — 52332 CYSTOSCOPY AND TREATMENT: CPT | Performed by: UROLOGY

## 2021-03-20 PROCEDURE — 87086 URINE CULTURE/COLONY COUNT: CPT | Performed by: UROLOGY

## 2021-03-20 PROCEDURE — 80048 BASIC METABOLIC PNL TOTAL CA: CPT | Performed by: INTERNAL MEDICINE

## 2021-03-20 PROCEDURE — 96376 TX/PRO/DX INJ SAME DRUG ADON: CPT

## 2021-03-20 PROCEDURE — C1769 GUIDE WIRE: HCPCS | Performed by: UROLOGY

## 2021-03-20 PROCEDURE — 85027 COMPLETE CBC AUTOMATED: CPT | Performed by: INTERNAL MEDICINE

## 2021-03-20 PROCEDURE — 99223 1ST HOSP IP/OBS HIGH 75: CPT | Performed by: UROLOGY

## 2021-03-20 PROCEDURE — 99239 HOSP IP/OBS DSCHRG MGMT >30: CPT | Performed by: INTERNAL MEDICINE

## 2021-03-20 PROCEDURE — 74420 UROGRAPHY RTRGR +-KUB: CPT

## 2021-03-20 PROCEDURE — BT1F1ZZ FLUOROSCOPY OF LEFT KIDNEY, URETER AND BLADDER USING LOW OSMOLAR CONTRAST: ICD-10-PCS | Performed by: UROLOGY

## 2021-03-20 PROCEDURE — C2617 STENT, NON-COR, TEM W/O DEL: HCPCS | Performed by: UROLOGY

## 2021-03-20 DEVICE — INLAY OPTIMA URETERAL STENT W/O GUIDEWIRE
Type: IMPLANTABLE DEVICE | Site: URETER | Status: FUNCTIONAL
Brand: BARD® INLAY OPTIMA® URETERAL STENT

## 2021-03-20 RX ORDER — HYDROMORPHONE HCL/PF 1 MG/ML
0.25 SYRINGE (ML) INJECTION
Status: DISCONTINUED | OUTPATIENT
Start: 2021-03-20 | End: 2021-03-20 | Stop reason: HOSPADM

## 2021-03-20 RX ORDER — ONDANSETRON 2 MG/ML
4 INJECTION INTRAMUSCULAR; INTRAVENOUS EVERY 6 HOURS PRN
Status: DISCONTINUED | OUTPATIENT
Start: 2021-03-20 | End: 2021-03-20 | Stop reason: HOSPADM

## 2021-03-20 RX ORDER — ONDANSETRON 2 MG/ML
4 INJECTION INTRAMUSCULAR; INTRAVENOUS ONCE AS NEEDED
Status: DISCONTINUED | OUTPATIENT
Start: 2021-03-20 | End: 2021-03-20 | Stop reason: HOSPADM

## 2021-03-20 RX ORDER — PANTOPRAZOLE SODIUM 40 MG/1
40 TABLET, DELAYED RELEASE ORAL
Status: DISCONTINUED | OUTPATIENT
Start: 2021-03-20 | End: 2021-03-20 | Stop reason: HOSPADM

## 2021-03-20 RX ORDER — HYDROMORPHONE HCL/PF 1 MG/ML
0.5 SYRINGE (ML) INJECTION ONCE
Status: COMPLETED | OUTPATIENT
Start: 2021-03-20 | End: 2021-03-20

## 2021-03-20 RX ORDER — TAMSULOSIN HYDROCHLORIDE 0.4 MG/1
0.8 CAPSULE ORAL
Status: DISCONTINUED | OUTPATIENT
Start: 2021-03-20 | End: 2021-03-20 | Stop reason: HOSPADM

## 2021-03-20 RX ORDER — OXYCODONE HYDROCHLORIDE 5 MG/1
5 TABLET ORAL EVERY 4 HOURS PRN
Status: DISCONTINUED | OUTPATIENT
Start: 2021-03-20 | End: 2021-03-20 | Stop reason: HOSPADM

## 2021-03-20 RX ORDER — CEFAZOLIN SODIUM 1 G/3ML
INJECTION, POWDER, FOR SOLUTION INTRAMUSCULAR; INTRAVENOUS AS NEEDED
Status: DISCONTINUED | OUTPATIENT
Start: 2021-03-20 | End: 2021-03-20

## 2021-03-20 RX ORDER — DEXAMETHASONE SODIUM PHOSPHATE 10 MG/ML
INJECTION, SOLUTION INTRAMUSCULAR; INTRAVENOUS AS NEEDED
Status: DISCONTINUED | OUTPATIENT
Start: 2021-03-20 | End: 2021-03-20

## 2021-03-20 RX ORDER — LIDOCAINE HYDROCHLORIDE 10 MG/ML
INJECTION, SOLUTION EPIDURAL; INFILTRATION; INTRACAUDAL; PERINEURAL AS NEEDED
Status: DISCONTINUED | OUTPATIENT
Start: 2021-03-20 | End: 2021-03-20

## 2021-03-20 RX ORDER — OXYCODONE HYDROCHLORIDE 5 MG/1
5 TABLET ORAL EVERY 4 HOURS PRN
Qty: 20 TABLET | Refills: 0 | Status: SHIPPED | OUTPATIENT
Start: 2021-03-20 | End: 2021-03-30

## 2021-03-20 RX ORDER — MAGNESIUM HYDROXIDE 1200 MG/15ML
LIQUID ORAL AS NEEDED
Status: DISCONTINUED | OUTPATIENT
Start: 2021-03-20 | End: 2021-03-20 | Stop reason: HOSPADM

## 2021-03-20 RX ORDER — CEFAZOLIN SODIUM 1 G/50ML
1000 SOLUTION INTRAVENOUS ONCE
Status: CANCELLED | OUTPATIENT
Start: 2021-03-20 | End: 2021-03-20

## 2021-03-20 RX ORDER — LABETALOL 20 MG/4 ML (5 MG/ML) INTRAVENOUS SYRINGE
10 EVERY 6 HOURS PRN
Status: DISCONTINUED | OUTPATIENT
Start: 2021-03-20 | End: 2021-03-20 | Stop reason: HOSPADM

## 2021-03-20 RX ORDER — SODIUM CHLORIDE, SODIUM GLUCONATE, SODIUM ACETATE, POTASSIUM CHLORIDE, MAGNESIUM CHLORIDE, SODIUM PHOSPHATE, DIBASIC, AND POTASSIUM PHOSPHATE .53; .5; .37; .037; .03; .012; .00082 G/100ML; G/100ML; G/100ML; G/100ML; G/100ML; G/100ML; G/100ML
100 INJECTION, SOLUTION INTRAVENOUS CONTINUOUS
Status: DISCONTINUED | OUTPATIENT
Start: 2021-03-20 | End: 2021-03-20

## 2021-03-20 RX ORDER — HYDROMORPHONE HCL IN WATER/PF 6 MG/30 ML
0.2 PATIENT CONTROLLED ANALGESIA SYRINGE INTRAVENOUS EVERY 4 HOURS PRN
Status: DISCONTINUED | OUTPATIENT
Start: 2021-03-20 | End: 2021-03-20 | Stop reason: HOSPADM

## 2021-03-20 RX ORDER — FENTANYL CITRATE/PF 50 MCG/ML
25 SYRINGE (ML) INJECTION
Status: DISCONTINUED | OUTPATIENT
Start: 2021-03-20 | End: 2021-03-20 | Stop reason: HOSPADM

## 2021-03-20 RX ORDER — FENTANYL CITRATE 50 UG/ML
INJECTION, SOLUTION INTRAMUSCULAR; INTRAVENOUS AS NEEDED
Status: DISCONTINUED | OUTPATIENT
Start: 2021-03-20 | End: 2021-03-20

## 2021-03-20 RX ORDER — PROPOFOL 10 MG/ML
INJECTION, EMULSION INTRAVENOUS AS NEEDED
Status: DISCONTINUED | OUTPATIENT
Start: 2021-03-20 | End: 2021-03-20

## 2021-03-20 RX ORDER — HYDROMORPHONE HCL/PF 1 MG/ML
0.5 SYRINGE (ML) INJECTION EVERY 2 HOUR PRN
Status: DISCONTINUED | OUTPATIENT
Start: 2021-03-20 | End: 2021-03-20 | Stop reason: HOSPADM

## 2021-03-20 RX ORDER — ONDANSETRON 4 MG/1
4 TABLET, FILM COATED ORAL EVERY 8 HOURS PRN
Qty: 20 TABLET | Refills: 0 | Status: SHIPPED | OUTPATIENT
Start: 2021-03-20

## 2021-03-20 RX ORDER — ASPIRIN 325 MG
325 TABLET, DELAYED RELEASE (ENTERIC COATED) ORAL DAILY
Status: DISCONTINUED | OUTPATIENT
Start: 2021-03-20 | End: 2021-03-20 | Stop reason: HOSPADM

## 2021-03-20 RX ORDER — ATORVASTATIN CALCIUM 20 MG/1
1 TABLET, FILM COATED ORAL
COMMUNITY
Start: 2020-10-16

## 2021-03-20 RX ORDER — ONDANSETRON 2 MG/ML
INJECTION INTRAMUSCULAR; INTRAVENOUS AS NEEDED
Status: DISCONTINUED | OUTPATIENT
Start: 2021-03-20 | End: 2021-03-20

## 2021-03-20 RX ORDER — ACETAMINOPHEN 325 MG/1
975 TABLET ORAL EVERY 8 HOURS SCHEDULED
Status: DISCONTINUED | OUTPATIENT
Start: 2021-03-20 | End: 2021-03-20 | Stop reason: HOSPADM

## 2021-03-20 RX ORDER — SODIUM CHLORIDE, SODIUM LACTATE, POTASSIUM CHLORIDE, CALCIUM CHLORIDE 600; 310; 30; 20 MG/100ML; MG/100ML; MG/100ML; MG/100ML
INJECTION, SOLUTION INTRAVENOUS CONTINUOUS PRN
Status: DISCONTINUED | OUTPATIENT
Start: 2021-03-20 | End: 2021-03-20

## 2021-03-20 RX ORDER — MELATONIN
1000 DAILY
Status: DISCONTINUED | OUTPATIENT
Start: 2021-03-20 | End: 2021-03-20 | Stop reason: HOSPADM

## 2021-03-20 RX ORDER — ATORVASTATIN CALCIUM 20 MG/1
20 TABLET, FILM COATED ORAL
Status: DISCONTINUED | OUTPATIENT
Start: 2021-03-20 | End: 2021-03-20 | Stop reason: HOSPADM

## 2021-03-20 RX ORDER — AMLODIPINE BESYLATE 5 MG/1
5 TABLET ORAL DAILY
Status: DISCONTINUED | OUTPATIENT
Start: 2021-03-20 | End: 2021-03-20 | Stop reason: HOSPADM

## 2021-03-20 RX ORDER — OXYCODONE HYDROCHLORIDE 5 MG/1
2.5 TABLET ORAL EVERY 4 HOURS PRN
Status: DISCONTINUED | OUTPATIENT
Start: 2021-03-20 | End: 2021-03-20 | Stop reason: HOSPADM

## 2021-03-20 RX ADMIN — HYDROMORPHONE HYDROCHLORIDE 0.5 MG: 1 INJECTION, SOLUTION INTRAMUSCULAR; INTRAVENOUS; SUBCUTANEOUS at 04:12

## 2021-03-20 RX ADMIN — PANTOPRAZOLE SODIUM 40 MG: 40 TABLET, DELAYED RELEASE ORAL at 06:11

## 2021-03-20 RX ADMIN — PROPOFOL 150 MG: 10 INJECTION, EMULSION INTRAVENOUS at 08:40

## 2021-03-20 RX ADMIN — ONDANSETRON 4 MG: 2 INJECTION INTRAMUSCULAR; INTRAVENOUS at 08:40

## 2021-03-20 RX ADMIN — CEFAZOLIN 1000 MG: 1 INJECTION, POWDER, FOR SOLUTION INTRAVENOUS at 08:45

## 2021-03-20 RX ADMIN — ACETAMINOPHEN 975 MG: 325 TABLET, FILM COATED ORAL at 14:27

## 2021-03-20 RX ADMIN — PHENYLEPHRINE HYDROCHLORIDE 100 MCG: 10 INJECTION INTRAVENOUS at 08:52

## 2021-03-20 RX ADMIN — FENTANYL CITRATE 25 MCG: 50 INJECTION INTRAMUSCULAR; INTRAVENOUS at 08:40

## 2021-03-20 RX ADMIN — ACETAMINOPHEN 975 MG: 325 TABLET, FILM COATED ORAL at 06:11

## 2021-03-20 RX ADMIN — FENTANYL CITRATE 25 MCG: 50 INJECTION INTRAMUSCULAR; INTRAVENOUS at 08:53

## 2021-03-20 RX ADMIN — FENTANYL CITRATE 25 MCG: 50 INJECTION INTRAMUSCULAR; INTRAVENOUS at 08:50

## 2021-03-20 RX ADMIN — PHENYLEPHRINE HYDROCHLORIDE 100 MCG: 10 INJECTION INTRAVENOUS at 08:44

## 2021-03-20 RX ADMIN — HYDROMORPHONE HYDROCHLORIDE 0.5 MG: 1 INJECTION, SOLUTION INTRAMUSCULAR; INTRAVENOUS; SUBCUTANEOUS at 03:30

## 2021-03-20 RX ADMIN — SODIUM CHLORIDE, SODIUM GLUCONATE, SODIUM ACETATE, POTASSIUM CHLORIDE, MAGNESIUM CHLORIDE, SODIUM PHOSPHATE, DIBASIC, AND POTASSIUM PHOSPHATE 100 ML/HR: .53; .5; .37; .037; .03; .012; .00082 INJECTION, SOLUTION INTRAVENOUS at 06:06

## 2021-03-20 RX ADMIN — LIDOCAINE HYDROCHLORIDE 80 MG: 10 INJECTION, SOLUTION EPIDURAL; INFILTRATION; INTRACAUDAL; PERINEURAL at 08:40

## 2021-03-20 RX ADMIN — SODIUM CHLORIDE, SODIUM LACTATE, POTASSIUM CHLORIDE, AND CALCIUM CHLORIDE: .6; .31; .03; .02 INJECTION, SOLUTION INTRAVENOUS at 08:35

## 2021-03-20 RX ADMIN — DEXAMETHASONE SODIUM PHOSPHATE 5 MG: 10 INJECTION, SOLUTION INTRAMUSCULAR; INTRAVENOUS at 08:44

## 2021-03-20 RX ADMIN — PHENYLEPHRINE HYDROCHLORIDE 100 MCG: 10 INJECTION INTRAVENOUS at 08:49

## 2021-03-20 RX ADMIN — HYDROMORPHONE HYDROCHLORIDE 0.5 MG: 1 INJECTION, SOLUTION INTRAMUSCULAR; INTRAVENOUS; SUBCUTANEOUS at 01:40

## 2021-03-20 RX ADMIN — AMLODIPINE BESYLATE 5 MG: 5 TABLET ORAL at 11:36

## 2021-03-20 RX ADMIN — FENTANYL CITRATE 25 MCG: 50 INJECTION INTRAMUSCULAR; INTRAVENOUS at 08:58

## 2021-03-20 NOTE — ASSESSMENT & PLAN NOTE
· Patient with history of kidney stones in past regarding intervention, presented with left-sided abdominal/flank pain  · CT abdomen/pelvis revealing Proximal left obstructing 1 4 cm ureteral calculus just beyond the renal pelvis causing moderate left hydroureteronephrosis "   · Transferred to this campus forUrology evaluation and likely intervention, appreciate their consultation  · Keep NPO for now  · IV fluids  · Pain control as per geriatric pain management order set  · Nausea control with Zofran p r n   · Initiate Flomax  · Strain all urine

## 2021-03-20 NOTE — ASSESSMENT & PLAN NOTE
· S/p remote CABG    Without current/recent chest pain  · Continue ASA, statin; not currently on beta-blocker

## 2021-03-20 NOTE — ANESTHESIA PREPROCEDURE EVALUATION
Procedure:  CYSTOSCOPY RETROGRADE PYELOGRAM WITH INSERTION STENT URETERAL (Left Bladder)    Relevant Problems   CARDIO   (+) Coronary artery disease involving native coronary artery of native heart without angina pectoris   (+) Hypertensive urgency      /RENAL   (+) Chronic kidney disease, stage 3b   (+) Hydronephrosis with urinary obstruction due to ureteral calculus   (+) Kidney stones      Other   (+) Left ureteral stone        Physical Exam    Airway    Mallampati score: II  TM Distance: >3 FB  Neck ROM: full     Dental       Cardiovascular  Cardiovascular exam normal    Pulmonary  Pulmonary exam normal     Other Findings        Anesthesia Plan  ASA Score- 2     Anesthesia Type- general with ASA Monitors  Additional Monitors:   Airway Plan: LMA  Plan Factors-Exercise tolerance (METS): <4 METS  Exercise comment: Some dyspnea on exertion  Denies CP  Able to walk up and down a flight of steps       Chart reviewed  Patient summary reviewed  Induction- intravenous  Postoperative Plan- Plan for postoperative opioid use  Planned trial extubation    Informed Consent- Anesthetic plan and risks discussed with patient  I personally reviewed this patient with the CRNA  Discussed and agreed on the Anesthesia Plan with the CRNA  Kostas Man

## 2021-03-20 NOTE — CONSULTS
UROLOGY CONSULTATION NOTE     Patient Identifiers: Kelli Linares (MRN [id number])  Service Requesting Consultation:  Medicine  Service Providing Consultation:  Urology, Dereck Ross MD    Date of Service: 3/20/2021  Inpatient consult to Urology  Consult performed by: Dereck Ross MD  Consult ordered by: José Sykes DO          Reason for Consultation:  Obstructing nephrolithiasis    History of Present Illness:     Kelli Linares is a 68 y o  old with a history of prior nephrolithiasis including sepsis several years ago requiring intervention, presented to the emergency department at [facility] yesterday with severe left flank pain  This was similar to his prior episodes of renal colic  Fortunately on this occasion, he did not have fever, chills, nausea, vomiting  He was found to have significant hypertension  Laboratory studies within normal limits with no evidence of sepsis, though there is mild CKD, unclear if this is acute or chronic  CT scan reviewed, revealed 1 4 cm obstructing left UPJ calculus as well as a nonobstructing large calculus  Patient was transferred to [facility] overnight for urologic intervention      Past Medical, Past Surgical History:     Past Medical History:   Diagnosis Date    Coronary artery disease     Hypertension     Kidney stones    :    Past Surgical History:   Procedure Laterality Date    CARDIAC SURGERY      CHOLECYSTECTOMY      CORONARY ARTERY BYPASS GRAFT      TONSILLECTOMY     :    Medications, Allergies:     Current Facility-Administered Medications   Medication Dose Route Frequency    [MAR Hold] acetaminophen (TYLENOL) tablet 975 mg  975 mg Oral Q8H [facility]    [MAR Hold] aspirin (ECOTRIN) EC tablet 325 mg  325 mg Oral Daily    [MAR Hold] atorvastatin (LIPITOR) tablet 20 mg  20 mg Oral HS    [MAR Hold] cholecalciferol (VITAMIN D3) tablet 1,000 Units  1,000 Units Oral Daily    [MAR Hold] cyanocobalamin (VITAMIN B-12) tablet 100 mcg  100 mcg Oral Daily    [MAR Hold] HYDROmorphone HCl (DILAUDID) injection 0 2 mg  0 2 mg Intravenous Q4H PRN    [MAR Hold] Labetalol HCl (NORMODYNE) injection 10 mg  10 mg Intravenous Q6H PRN    multi-electrolyte (PLASMALYTE-A/ISOLYTE-S PH 7 4) IV solution  100 mL/hr Intravenous Continuous    [MAR Hold] ondansetron (ZOFRAN) injection 4 mg  4 mg Intravenous Q6H PRN    [MAR Hold] oxyCODONE (ROXICODONE) IR tablet 2 5 mg  2 5 mg Oral Q4H PRN    [MAR Hold] oxyCODONE (ROXICODONE) IR tablet 5 mg  5 mg Oral Q4H PRN    [MAR Hold] pantoprazole (PROTONIX) EC tablet 40 mg  40 mg Oral Early Morning    [MAR Hold] tamsulosin (FLOMAX) capsule 0 8 mg  0 8 mg Oral Daily With Dinner       Allergies: Allergies   Allergen Reactions    Lisinopril Angioedema   :    Social and Family History:   Social History:   Social History     Tobacco Use    Smoking status: Never Smoker    Smokeless tobacco: Never Used   Substance Use Topics    Alcohol use: Yes     Alcohol/week: 4 0 standard drinks     Types: 4 Glasses of wine per week     Frequency: 4 or more times a week     Drinks per session: 1 or 2     Binge frequency: Never    Drug use: Never     Social History     Tobacco Use   Smoking Status Never Smoker   Smokeless Tobacco Never Used       Family History:  History reviewed  No pertinent family history :     Review of Systems:     General: Fever, chills, or night sweats: negative  Cardiac: Negative for chest pain  Pulmonary: Negative for shortness of breath  Gastrointestinal: Abdominal pain negative  Nausea, vomiting, or diarrhea negative,  Genitourinary: See HPI above  Patient does not have hematuria  All other systems queried were negative  Physical Exam:   General: Patient is pleasant and in NAD   Awake and alert  BP (!) 180/80 (BP Location: Right arm) Comment: Dr Reeder Hilary aware and at the bedside  Pulse 67   Temp 97 9 °F (36 6 °C) (Oral)   Resp 16   Ht 5' 7" (1 702 m)   Wt 80 3 kg (177 lb) SpO2 98%   BMI 27 72 kg/m² Temp (24hrs), Av 8 °F (36 6 °C), Min:97 5 °F (36 4 °C), Max:97 9 °F (36 6 °C)  current; Temperature: 97 9 °F (36 6 °C)  I/O last 24 hours: In: -   Out: 100 [Urine:100]  Head: Normocephalic, without obvious abnormality, atraumatic  Eyes: negative  Lungs: clear to auscultation bilaterally  Heart: regular rate and rhythm  Abdomen: soft, non-tender; bowel sounds normal; no masses,  no organomegaly  Extremities: extremities normal, warm and well-perfused; no cyanosis, clubbing, or edema  Skin: Skin color, texture, turgor normal  No rashes or lesions  Neurologic: Grossly normal    (Male):  Left CVA tenderness    Labs:     Lab Results   Component Value Date    HGB 13 1 2021    HCT 40 5 2021    WBC 9 63 2021     2021   ]    Lab Results   Component Value Date    K 4 0 2021     2021    CO2 26 2021    BUN 24 2021    CREATININE 1 59 (H) 2021    CALCIUM 9 3 2021   ]    Imaging:   I personally reviewed the images and report of the following studies, and reviewed them with the patient:    CT Abdomen: Personally reviewed with findings as above  ASSESSMENT:     68 y o  old male with  obstructing large left renal calculus and persistent pain  Mild CKD, unclear if acute or chronic     PLAN:     Reviewed all the findings with the patient  We reviewed the CT scan images together  He is able to see the obstructing large renal calculus at the UPJ as well as nonobstructing calculus  Discussed options for intervention at this time  I have recommended temporizing placement of left ureteral stent  He understands that he will require subsequent second-stage definitive ureteroscopy with laser to deal with both large calculi  This should be done on a scheduled elective basis as it will take a good deal of time and preferably high-power laser in order to perform this procedure appropriately    He understands that at the conclusion of this procedure he should have reduction in pain but may have symptoms of stent colic    He has had stents in place before and understands  All questions answered to his satisfaction  Consent obtained for cystoscopy, left ureteral stent placement  Thank you for allowing me to participate in this patients care  Please do not hesitate to call with any additional questions    Deanna Chaparro MD

## 2021-03-20 NOTE — ASSESSMENT & PLAN NOTE
Suspect in setting of obstructive renal stone and pain along with missing losartan dose  · Improved  · S/p dose of norvasc this AM (in place of cozaar)  · Can stop IVF for now

## 2021-03-20 NOTE — OP NOTE
OPERATIVE REPORT  PATIENT NAME: Shanta Pastrana    :  1940  MRN: [id number]  Pt Location:  CYSTO ROOM 01    SURGERY DATE: 3/20/2021    Surgeon(s) and Role:     Meghann Morgan MD - Primary    Preop Diagnosis:  Left ureteral calculus [N20 1]    Post-Op Diagnosis Codes:     * Left ureteral calculus [N20 1]    Procedure(s) (LRB):  CYSTOSCOPY RETROGRADE PYELOGRAM WITH INSERTION STENT URETERAL (Left)    Specimen(s):  ID Type Source Tests Collected by Time Destination   A :  Urine Urine, Other URINE CULTURE Aparna Kerr MD 3/20/2021 1655        Estimated Blood Loss:   Minimal    Drains:  Urethral Catheter Latex;Straight-tip 18 Fr  (Active)   Site Assessment Clean;Skin intact 21 0912   Number of days: 0       Anesthesia Type:   General    Operative Indications:  Left ureteral calculus [N20 1]      Operative Findings:  Obstructing left UPJ calculus, not radio-opaque  Complications:   None    Procedure and Technique:  The patient was identified, brought to the operating room, and placed on the table in supine position  After induction of general anesthesia, the patient was placed in dorsal lithotomy position and prepped and draped in the usual sterile fashion  A complete formal timeout was performed  The 25 [place] rigid cystoscope was placed per urethra and cystoscopy was performed  Prostate is enlarged but not obstructing  There was no bladder abnormality identified  The Left ureteral orifice was identified and cannulated with a Solo wire  5 Fr catheter was placed, and a retrograde pyelogram was performed delineating the upper urinary tract anatomy  Obstructing UPJ stone was not readily apparent on x-ray, but filling defect identified on retrograde pyelogram   The Solo wire was replaced and a 26 cm x 6 [place] double-J stent was placed without string  A Remy catheter was placed    The patient tolerated the procedure well and was transferred to the recovery room awake alert and in stable condition  Plan:  Patient may be discharged home from urology standpoint when medically stable  He understands that the plan is for elective second-stage ureteroscopy with laser in a few weeks       I was present for the entire procedure    Patient Disposition:  PACU     SIGNATURE: Debo Jacques MD  DATE: March 20, 2021  TIME: 9:26 AM

## 2021-03-20 NOTE — PLAN OF CARE
Problem: PAIN - ADULT  Goal: Verbalizes/displays adequate comfort level or baseline comfort level  Description: Interventions:  - Encourage patient to monitor pain and request assistance  - Assess pain using appropriate pain scale  - Administer analgesics based on type and severity of pain and evaluate response  - Implement non-pharmacological measures as appropriate and evaluate response  - Consider cultural and social influences on pain and pain management  - Notify physician/advanced practitioner if interventions unsuccessful or patient reports new pain  Outcome: Progressing     Problem: INFECTION - ADULT  Goal: Absence or prevention of progression during hospitalization  Description: INTERVENTIONS:  - Assess and monitor for signs and symptoms of infection  - Monitor lab/diagnostic results  - Monitor all insertion sites, i e  indwelling lines, tubes, and drains  - Monitor endotracheal if appropriate and nasal secretions for changes in amount and color  - [place] appropriate cooling/warming therapies per order  - Administer medications as ordered  - Instruct and encourage patient and family to use good hand hygiene technique  - Identify and instruct in appropriate isolation precautions for identified infection/condition  Outcome: Progressing  Goal: Absence of fever/infection during neutropenic period  Description: INTERVENTIONS:  - Monitor WBC    Outcome: Progressing     Problem: SAFETY ADULT  Goal: Patient will remain free of falls  Description: INTERVENTIONS:  - Assess patient frequently for physical needs  -  Identify cognitive and physical deficits and behaviors that affect risk of falls    -  [place] fall precautions as indicated by assessment   - Educate patient/family on patient safety including physical limitations  - Instruct patient to call for assistance with activity based on assessment  - Modify environment to reduce risk of injury  - Consider OT/PT consult to assist with strengthening/mobility  Outcome: Progressing  Goal: Maintain or return to baseline ADL function  Description: INTERVENTIONS:  -  Assess patient's ability to carry out ADLs; assess patient's baseline for ADL function and identify physical deficits which impact ability to perform ADLs (bathing, care of mouth/teeth, toileting, grooming, dressing, etc )  - Assess/evaluate cause of self-care deficits   - Assess range of motion  - Assess patient's mobility; develop plan if impaired  - Assess patient's need for assistive devices and provide as appropriate  - Encourage maximum independence but intervene and supervise when necessary  - Involve family in performance of ADLs  - Assess for home care needs following discharge   - Consider OT consult to assist with ADL evaluation and planning for discharge  - Provide patient education as appropriate  Outcome: Progressing  Goal: Maintain or return mobility status to optimal level  Description: INTERVENTIONS:  - Assess patient's baseline mobility status (ambulation, transfers, stairs, etc )    - Identify cognitive and physical deficits and behaviors that affect mobility  - Identify mobility aids required to assist with transfers and/or ambulation (gait belt, sit-to-stand, lift, walker, cane, etc )  - [place] fall precautions as indicated by assessment  - Record patient progress and toleration of activity level on Mobility SBAR; progress patient to next Phase/Stage  - Instruct patient to call for assistance with activity based on assessment  - Consider rehabilitation consult to assist with strengthening/weightbearing, etc   Outcome: Progressing     Problem: DISCHARGE PLANNING  Goal: Discharge to home or other facility with appropriate resources  Description: INTERVENTIONS:  - Identify barriers to discharge w/patient and caregiver  - Arrange for needed discharge resources and transportation as appropriate  - Identify discharge learning needs (meds, wound care, etc )  - Arrange for interpretive services to assist at discharge as needed  - Refer to Case Management Department for coordinating discharge planning if the patient needs post-hospital services based on physician/advanced practitioner order or complex needs related to functional status, cognitive ability, or social support system  Outcome: Progressing     Problem: Knowledge Deficit  Goal: Patient/family/caregiver demonstrates understanding of disease process, treatment plan, medications, and discharge instructions  Description: Complete learning assessment and assess knowledge base  Interventions:  - Provide teaching at level of understanding  - Provide teaching via preferred learning methods  Outcome: Progressing     Problem: Potential for Falls  Goal: Patient will remain free of falls  Description: INTERVENTIONS:  - Assess patient frequently for physical needs  -  Identify cognitive and physical deficits and behaviors that affect risk of falls    -  [place] fall precautions as indicated by assessment   - Educate patient/family on patient safety including physical limitations  - Instruct patient to call for assistance with activity based on assessment  - Modify environment to reduce risk of injury  - Consider OT/PT consult to assist with strengthening/mobility  Outcome: Progressing

## 2021-03-20 NOTE — ASSESSMENT & PLAN NOTE
Lab Results   Component Value Date    EGFR 44 03/19/2021    CREATININE 1 50 (H) 03/19/2021   · Creatinine near recent baseline, monitor with BMP  · Avoid/limit nephrotoxins and avoid hypotension

## 2021-03-20 NOTE — ASSESSMENT & PLAN NOTE
· Suspect in setting of obstructive renal stone  · Pain control as above, will provide IV p r n  antihypertensive for sustained SBP greater than 180  · Resume home losartan postoperatively

## 2021-03-20 NOTE — DISCHARGE SUMMARY
[facility] [facility]  Discharge- Nile Sers 1940, 68 y o  male MRN: [id number]  Unit/Bed#: [facility] 317-01 Encounter: [id number]  Primary Care Provider: Blake Staton MD   Date and time admitted to hospital: 3/20/2021  5:23 AM    * Hydronephrosis with urinary obstruction due to ureteral calculus  Assessment & Plan  Patient with history of kidney stones in past regarding intervention, presented with left-sided abdominal/flank pain  · CT abdomen/pelvis revealing Proximal left obstructing 1 4 cm ureteral calculus just beyond the renal pelvis causing moderate left hydroureteronephrosis "   · Transferred to this [place] for Urology evaluation  · S/P L cysto, retrograde pyelogram and L ureteral stent  · Will need f/u in few weeks for second stage ureteroscopy with laser in few weeks    Hypertensive urgency  Assessment & Plan  Suspect in setting of obstructive renal stone and pain along with missing losartan dose  · Improved  · S/p dose of norvasc this AM (in place of cozaar)  · Can stop IVF for now     Chronic kidney disease, stage 3b  Assessment & Plan  Lab Results   Component Value Date    EGFR 41 03/20/2021    EGFR 44 03/19/2021    CREATININE 1 59 (H) 03/20/2021    CREATININE 1 50 (H) 03/19/2021   · Creatinine baseline around 1 2-1 4, on arrival was 1 5 likely 2/2 above  · Cozaar can likely be resumed tomorrow  · tolerating oral intake    Coronary artery disease involving native coronary artery of native heart without angina pectoris  Assessment & Plan  S/p remote CABG    Without current/recent chest pain  · Continue ASA, statin; not currently on beta-blocker      Resolved Problems  Date Reviewed: 3/20/2021    None        Discharging Physician / Practitioner: Kristin Mcnally PA-C  PCP: Blake Staton MD  Admission Date:   Admission Orders (From admission, onward)     Ordered        03/20/21 0547  Inpatient Admission  Once                   Discharge Date: 03/20/21    Consultations During Hospital Stay:  · Urology    Procedures Performed:   · 3/20: CYSTOSCOPY RETROGRADE PYELOGRAM WITH INSERTION STENT URETERAL (Left)    Significant Findings / Test Results:   · See above    Incidental Findings:   · See above     Test Results Pending at Discharge (will require follow up):   · none     Outpatient Tests Requested:  · F/u urology in few weeks for staged procedure     Complications:  none    Reason for Admission: kidney stone    Hospital Course:   Stefany Erwin is a 68 y o  male patient with PMHx of CKD 3 (baseline creat 1 2-1 4), HTN, CAD s/p remote CABG and kidney stones who originally presented to the hospital on 3/20/2021 due to need for urology evaluation for kidney stone  He presented with L sided flank[facility]dmoinal [facility] and was tx to [facility]  Had procedure this AM with stent placement  Will need definitive stone management in several weeks  Cleared by urology for discharge home  The patient, initially admitted to the hospital as inpatient, was discharged earlier than expected given the following: rapid resolution in symptoms sooner than expected   Please see above list of diagnoses and related plan for additional information  Condition at Discharge: stable     Discharge Day Visit / Exam:   Subjective:  Doing well  No complaints  Vitals: Blood Pressure: 158/76 (03/20/21 1136)  Pulse: 57 (03/20/21 1045)  Temperature: 97 9 °F (36 6 °C) (03/20/21 0951)  Temp Source: Oral (03/20/21 0951)  Respirations: 16 (03/20/21 0951)  Height: 5' 7" (170 2 cm) (03/20/21 0534)  Weight - Scale: 80 3 kg (177 lb) (03/20/21 0534)  SpO2: 93 % (03/20/21 0951)  Exam:   Physical Exam  Vitals signs and nursing note reviewed  Constitutional:       General: He is not in acute distress  Cardiovascular:      Rate and Rhythm: Normal rate and regular rhythm  Pulmonary:      Effort: No respiratory distress  Abdominal:      General: There is no distension  Tenderness:  There is no abdominal tenderness  Neurological:      Mental Status: He is oriented to person, place, and time  Psychiatric:         Mood and Affect: Mood normal           Discussion with Family: Updated  (wife) at bedside  Discharge instructions/Information to patient and family:   See after visit summary for information provided to patient and family  Provisions for Follow-Up Care:  See after visit summary for information related to follow-up care and any pertinent home health orders  Disposition:   Home    Planned Readmission: yes for staged stone procedure     Discharge Statement:  I spent 34 minutes discharging the patient  This time was spent on the day of discharge  I had direct contact with the patient on the day of discharge  Greater than 50% of the total time was spent examining patient, answering all patient questions, arranging and discussing plan of care with patient as well as directly providing post-discharge instructions  Additional time then spent on discharge activities  Discharge Medications:  See after visit summary for reconciled discharge medications provided to patient and/or family        **Please Note: This note may have been constructed using a voice recognition system**

## 2021-03-20 NOTE — ASSESSMENT & PLAN NOTE
Lab Results   Component Value Date    EGFR 41 03/20/2021    EGFR 44 03/19/2021    CREATININE 1 59 (H) 03/20/2021    CREATININE 1 50 (H) 03/19/2021   · Creatinine baseline around 1 2-1 4, on arrival was 1 5 likely 2/2 above  · Cozaar can likely be resumed tomorrow  · tolerating oral intake

## 2021-03-20 NOTE — ANESTHESIA POSTPROCEDURE EVALUATION
Post-Op Assessment Note    CV Status:  Stable  Pain Score: 0    Pain management: adequate     Mental Status:  Alert and awake   Hydration Status:  Stable   PONV Controlled:  None   Airway Patency:  Patent      Post Op Vitals Reviewed: Yes      Staff: CRNA         No complications documented      BP  153/70   Temp   97 7   Pulse  76   Resp   14   SpO2   100% 4 L MASK

## 2021-03-20 NOTE — ASSESSMENT & PLAN NOTE
S/p remote CABG    Without current/recent chest pain  · Continue ASA, statin; not currently on beta-blocker

## 2021-03-20 NOTE — H&P
[address] [facility]  H&P- Haris Earl 1940, 68 y o  male MRN: [id number]  Unit/Bed#: [facility] 317-01 Encounter: [id number]  Primary Care Provider: Erwin Carmona MD   Date and time admitted to hospital: 3/20/2021  5:23 AM    * Hydronephrosis with urinary obstruction due to ureteral calculus  Assessment & Plan  · Patient with history of kidney stones in past regarding intervention, presented with left-sided abdominal/flank pain  · CT abdomen/pelvis revealing Proximal left obstructing 1 4 cm ureteral calculus just beyond the renal pelvis causing moderate left hydroureteronephrosis "   · Transferred to this [place] forUrology evaluation and likely intervention, appreciate their consultation  · Keep NPO for now  · IV fluids  · Pain control as per geriatric pain management order set  · Nausea control with Zofran p r n   · Initiate Flomax  · Strain all urine    Hypertensive urgency  Assessment & Plan  · Suspect in setting of obstructive renal stone  · Pain control as above, will provide IV p r n  antihypertensive for sustained SBP greater than 180  · Resume home losartan postoperatively    Chronic kidney disease, stage 3b  Assessment & Plan  Lab Results   Component Value Date    EGFR 44 03/19/2021    CREATININE 1 50 (H) 03/19/2021   · Creatinine near recent baseline, monitor with BMP  · Avoid/limit nephrotoxins and avoid hypotension    Coronary artery disease involving native coronary artery of native heart without angina pectoris  Assessment & Plan  · S/p remote CABG  Without current/recent chest pain  · Continue ASA, statin; not currently on beta-blocker      VTE Prophylaxis: Pharmacologic VTE Prophylaxis contraindicated due to Preop  / sequential compression device   Code Status: Level 1 - Full Code  POLST: POLST form is not discussed and not completed at this time     Discussion with family:  Offered however patient declines at this time    Anticipated Length of Stay:  Patient will be admitted on an Inpatient basis with an anticipated length of stay of  greater than 2 midnights  Justification for Hospital Stay: Please see detailed plans noted above  Chief Complaint:     Left abdominal/flank pain  History of Present Illness:  Enedina Myrick is a 68 y o  male who has a past medical history significant for CAD s/p remote CABG, hypertension, and renal stones which have required interventions in the past   Initially presented to the [facility] ED with complaints of sudden onset left-sided abdominal pain described as sharp, intermittent, radiating into left-sided back, and without alleviating/exacerbating factors  States the pain is similar to prior episodes of kidney stones  Denied fevers/chills, nausea/vomiting/diarrhea, dysuria, hematuria, chest pain/pressure, shortness of breath, or dizziness/lightheadedness  During ED evaluation he underwent CT renal stone study which revealed a proximal left obstructing 1 4 cm renal calculus just be on the renal pelvis causing hydroureteronephrosis  Was provided pain control in [facility] ED, and subsequently transferred to this campus for Urology evaluation and likely intervention  Currently, patient is lying in bed uncomfortable appearing, and endorses continued 10/10 left-sided abdominal pain as described above  Continues to deny fevers/chills, nausea/vomiting/diarrhea, dysuria, or chest pain/pressure      Review of Systems:    Constitutional:  Denies fever or chills   Eyes:  Denies change in visual acuity   HENT:  Denies nasal congestion or sore throat   Respiratory:  Denies cough or shortness of breath   Cardiovascular:  Denies chest pain or edema   GI:  Denies nausea, vomiting, bloody stools or diarrhea but endorses abdominal pain  :  Denies dysuria but endorses flank pain  Musculoskeletal:  Denies back pain or joint pain   Integument:  Denies rash   Neurologic:  Denies headache, focal weakness or sensory changes   Endocrine:  Denies polyuria or polydipsia   Lymphatic:  Denies swollen glands   Psychiatric:  Denies depression or anxiety     Past Medical and Surgical History:   Past Medical History:   Diagnosis Date    Coronary artery disease     Hypertension     Kidney stones      Past Surgical History:   Procedure Laterality Date    CARDIAC SURGERY      CHOLECYSTECTOMY      CORONARY ARTERY BYPASS GRAFT      TONSILLECTOMY         Meds/Allergies:  Medications Prior to Admission   Medication    atorvastatin (Lipitor) 20 mg tablet    aspirin (ECOTRIN) 325 mg EC tablet    cholecalciferol (VITAMIN D3) 1,000 units tablet    cyanocobalamin (VITAMIN B-12) 100 mcg tablet    losartan (COZAAR) 25 mg tablet    omeprazole (PriLOSEC) 20 mg delayed release capsule    Silodosin (Rapaflo) 8 MG CAPS    tadalafil (CIALIS) 5 MG tablet       Allergies:    Allergies   Allergen Reactions    Lisinopril Angioedema     History:  Marital Status: /Civil Union     Substance Use History:   Social History     Substance and Sexual Activity   Alcohol Use Yes    Alcohol/week: 4 0 standard drinks    Types: 4 Glasses of wine per week    Frequency: 4 or more times a week    Drinks per session: 1 or 2    Binge frequency: Never     Social History     Tobacco Use   Smoking Status Never Smoker   Smokeless Tobacco Never Used     Social History     Substance and Sexual Activity   Drug Use Never       Family History:  Noncontributory  Physical Exam:     Vitals:   Blood Pressure: (!) 180/80 (03/20/21 0534)  Pulse: 67 (03/20/21 0534)  Temperature: 97 9 °F (36 6 °C) (03/20/21 0534)  Temp Source: Oral (03/20/21 0534)  Respirations: 16 (03/20/21 0534)  Height: 5' 7" (170 2 cm) (03/20/21 0534)  Weight - Scale: 80 3 kg (177 lb) (03/20/21 0534)  SpO2: 98 % (03/20/21 0534)    Constitutional:  Well developed, well nourished, no acute distress, non-toxic appearance   Eyes:  PERRL, conjunctiva normal   HENT:  Atraumatic, external ears normal, nose normal, oropharynx moist, no pharyngeal exudates  Neck- normal range of motion, no tenderness, supple   Respiratory:  No respiratory distress, normal breath sounds, no rales, no wheezing   Cardiovascular:  Normal rate, normal rhythm, no murmurs, no gallops, no rubs   GI:  Soft, nondistended, normal bowel sounds, LUQ tenderness to palpation, no organomegaly, no mass, no rebound, no guarding   :  No costovertebral angle tenderness   Musculoskeletal:  No edema, no tenderness, no deformities  Back- no tenderness  Integument:  Well hydrated, no rash   Lymphatic:  No lymphadenopathy noted   Neurologic:  Alert &awake, communicative, CN 2-12 normal, normal motor function, normal sensory function, no focal deficits noted   Psychiatric:  Speech and behavior appropriate       Lab Results: I have personally reviewed pertinent reports  Results from last 7 days   Lab Units 03/19/21  1432   WBC Thousand/uL 8 64   HEMOGLOBIN g/dL 12 4   HEMATOCRIT % 38 4   PLATELETS Thousands/uL 184   NEUTROS PCT % 81*   LYMPHS PCT % 12*   MONOS PCT % 7   EOS PCT % 0     Results from last 7 days   Lab Units 03/19/21  1432   POTASSIUM mmol/L 4 4   CHLORIDE mmol/L 105   CO2 mmol/L 25   BUN mg/dL 28*   CREATININE mg/dL 1 50*   CALCIUM mg/dL 9 2   ALK PHOS U/L 76   ALT U/L 28   AST U/L 21     Results from last 7 days   Lab Units 03/19/21  1432   INR  1 02       EKG:  Normal sinus rhythm, nonspecific ST-T changes    Imaging: I have personally reviewed pertinent reports  Ct Renal Stone Study Abdomen Pelvis Without Contrast    Result Date: 3/19/2021  Narrative: CT ABDOMEN AND PELVIS WITHOUT IV CONTRAST - LOW DOSE RENAL STONE INDICATION:   Flank pain, kidney stone suspected left flank pain  COMPARISON:  None  TECHNIQUE:  Low dose thin section CT examination of the abdomen and pelvis was performed without intravenous or oral contrast according to a protocol specifically designed to evaluate for urinary tract calculus    Axial, sagittal, and coronal 2D reformatted images were created from the source data and submitted for interpretation  Evaluation for pathology in the abdomen and pelvis that is unrelated to urinary tract calculi is limited  Radiation dose length product (DLP) for this visit:  329 37 mGy-cm   This examination, like all CT scans performed in the [facility], was performed utilizing techniques to minimize radiation dose exposure, including the use of iterative  reconstruction and automated exposure control  FINDINGS: RIGHT KIDNEY AND URETER: No urinary tract calculi  No hydronephrosis or hydroureter  LEFT KIDNEY AND URETER: Proximal left obstructing 1 4 cm ureteral calculus just beyond the renal pelvis causing moderate left hydroureteronephrosis  Numerous other subcentimeter left renal calculi are identified largest in the lower pole measuring 8 mm  Left renal simple attenuating cyst and left perinephric stranding could represent edema rather than infection, correlate with urinalysis to exclude urinary tract infection  URINARY BLADDER: Enlarged prostate protruding into the bladder base  No significant abnormality in the visualized lung bases  Limited low radiation dose noncontrast CT evaluation demonstrates no clinically significant abnormality of liver, spleen, pancreas, or adrenal glands  The gallbladder is surgically absent  No ascites or bulky lymphadenopathy on this limited noncontrast study  Colonic diverticula are noted, without evidence to suggest acute diverticulitis  Visualized bowel appears otherwise unremarkable  Limited evaluation demonstrates no evidence to suggest acute appendicitis  Grade 1 anterolisthesis of L4 on L5 likely from facet arthrosis degenerative changes  Impression: Proximal left obstructing 1 4 cm ureteral calculus just beyond the renal pelvis causing moderate left hydroureteronephrosis  Numerous other subcentimeter left renal calculi are identified largest in the lower pole measuring 8 mm    Left renal simple attenuating cyst and left perinephric stranding could represent edema rather than infection, correlate with urinalysis to exclude urinary tract infection  Enlarged prostate protruding into the bladder base  Colonic diverticulosis  Workstation performed: [id number]         ** Please Note: Dragon 360 Dictation voice to text software was used in the creation of this document   **

## 2021-03-20 NOTE — DISCHARGE INSTRUCTIONS
Dear Rachael Tijerina,     It was our pleasure to care for you here at [facility]  It is our hope that we were always able to exceed the expected standards for your care during your stay  You were hospitalized due to kidney stones  You were cared for on the 3rd floor by Anna Mi PA-C under the service of Cj Levine MD with the [facility] who covers for your primary care physician (PCP), Razia Short MD, while you were hospitalized  You also saw Dr Enma Gao of urology and he performed your stent insertion procedure  If you have any questions regarding stent/urologic follow up, please call urology office at [phone]  For follow up as well as any medication refills, we recommend that you follow up with your primary care physician  · Notable Medication Adjustments -   · Added pain medication as needed  · Added nausea medication as needed  · Resume your losartan TOMORROW 3/21  · Testing Required after Discharge -   · F/u primary care doctor  · F/u urology to discuss further stone procedure   · Important follow up information -   · Expected stent pain and blood in urine, contact urology with any other questions  · Please review this entire after visit summary as additional general instructions including medication list, appointments, activity, diet, any pertinent wound care, and other additional recommendations from your care team that may be provided for you        Sincerely,     Anna Mi PA-C

## 2021-03-20 NOTE — ASSESSMENT & PLAN NOTE
Patient with history of kidney stones in past regarding intervention, presented with left-sided abdominal/flank pain  · CT abdomen/pelvis revealing Proximal left obstructing 1 4 cm ureteral calculus just beyond the renal pelvis causing moderate left hydroureteronephrosis "   · Transferred to this campus for Urology evaluation  · S/P L cysto, retrograde pyelogram and L ureteral stent  · Will need f/u in few weeks for second stage ureteroscopy with laser in few weeks

## 2021-03-21 LAB
ATRIAL RATE: 60 BPM
BACTERIA UR CULT: NORMAL
P AXIS: -7 DEGREES
PR INTERVAL: 116 MS
QRS AXIS: 35 DEGREES
QRSD INTERVAL: 82 MS
QT INTERVAL: 400 MS
QTC INTERVAL: 400 MS
T WAVE AXIS: 24 DEGREES
VENTRICULAR RATE: 60 BPM

## 2021-03-21 PROCEDURE — 93010 ELECTROCARDIOGRAM REPORT: CPT | Performed by: INTERNAL MEDICINE

## 2021-03-22 ENCOUNTER — TELEPHONE (OUTPATIENT)
Dept: UROLOGY | Facility: CLINIC | Age: 81
End: 2021-03-22

## 2021-03-22 NOTE — TELEPHONE ENCOUNTER
Post Op Note    Jessica Moura is a 68 y o  male s/p CYSTOSCOPY RETROGRADE PYELOGRAM WITH INSERTION STENT URETERAL (Left) performed 3/20/2021 by Dr Val Couch      Plan:   He understands that the plan is for elective second-stage ureteroscopy with laser in a few weeks  Attempted to reach patient to discuss, however voicemail box not set up

## 2021-03-24 NOTE — PHYSICIAN ADVISOR
Current patient class: Inpatient  The patient is currently on Hospital Day: 1 at [facility]      The patient was admitted to the hospital  on 3/20/21 at 030 70 25 13 for the following diagnosis:  Ureteral calculi [N20 1]     After review of the relevant documentation, labs, vital signs and test results, this is a PROVIDER LIABLE case  In this particular case the patient was admitted to the hospital as an inpatient  The patient however failed to satisfy the 2 midnight benchmark and closer scrutiny of the case was warranted  After review of the patient presentation and relevant labs the patient was most appropriate for observation or outpatient class at the time of admission  Given that this patient has already been discharged prior to this review they become a provider liable case  Rationale is as follows: The patient is a 68-year-old male who was hospitalized for 12 hours at [facility]  He was transferred from [facility] and crossed one midnight in the hospital between both campuses  He was transferred for urologic intervention  He was found to have a large obstructing stone  "Had procedure this AM with stent placement  Will need definitive stone management in several weeks  Cleared by urology for discharge home "   Based on his length of stay and planned urologic evlauation and procedure,  observation would have been appropriate    The patients vitals on arrival were   ED Triage Vitals   Temperature Pulse Respirations Blood Pressure SpO2   03/20/21 0524 03/20/21 0534 03/20/21 0534 03/20/21 0534 03/20/21 0534   97 9 °F (36 6 °C) 67 16 (!) 180/80 98 %      Temp Source Heart Rate Source Patient Position - Orthostatic VS BP Location FiO2 (%)   03/20/21 0524 03/20/21 0534 03/20/21 0534 03/20/21 0534 --   Oral Monitor Lying Right arm       Pain Score       03/20/21 0534       Worst Possible Pain           Past Medical History:   Diagnosis Date    Coronary artery disease     Hypertension     Kidney stones      Past Surgical History:   Procedure Laterality Date    CARDIAC SURGERY      CHOLECYSTECTOMY      CORONARY ARTERY BYPASS GRAFT      FL RETROGRADE PYELOGRAM  3/20/2021    WI CYSTOURETHROSCOPY,URETER CATHETER Left 3/20/2021    Procedure: CYSTOSCOPY RETROGRADE PYELOGRAM WITH INSERTION STENT URETERAL;  Surgeon: Debo Jacques MD;  Location: [facility];  Service: Urology    TONSILLECTOMY             Consults have been placed to:   IP CONSULT TO UROLOGY    Vitals:    03/20/21 1030 03/20/21 1045 03/20/21 1136 03/20/21 1313   BP: (!) 171/97 153/74 158/76 169/77   BP Location:       Pulse: (!) 111 57     Resp:       Temp:       TempSrc:       SpO2:       Weight:       Height:           Most recent labs:    No results for input(s): WBC, HGB, HCT, PLT, K, NA, CALCIUM, BUN, CREATININE, LIPASE, AMYLASE, INR, TROPONINI, CKTOTAL, AST, ALT, ALKPHOS, BILITOT in the last 72 hours  Scheduled Meds:  Continuous Infusions:No current facility-administered medications for this encounter  PRN Meds:      Surgical procedures (if appropriate):  Procedure(s):  CYSTOSCOPY RETROGRADE PYELOGRAM WITH INSERTION STENT URETERAL

## 2021-03-25 ENCOUNTER — PREP FOR PROCEDURE (OUTPATIENT)
Dept: UROLOGY | Facility: AMBULATORY SURGERY CENTER | Age: 81
End: 2021-03-25

## 2021-03-25 NOTE — TELEPHONE ENCOUNTER
I called pt this morning to try and discuss scheduling his follow up L  Ureteroscopy procedure  There was no answer and pt 's voicemail is not set up  I will call pt again next week to discuss

## 2021-03-26 NOTE — TELEPHONE ENCOUNTER
I called pt again this morning and was able to speak with him  I offered to schedule pt for his follow up left ureteroscopy procedure with Dr Elio Murguia on 4/22/21 at the [address] [place] [address]  Pt declined and stated that he has decided to go back to his urologist in [place] to follow up with this stone  Pt stated that he has a appt with Dr Milly Oakley at [facility], and he has a appt with their office coming up  I asked pt if he will need any records sent for his appt and pt declined and stated the office will contact our office if they need anything  Pt asked that I thank Dr Jennifer Ireland for all of his help with this stone and stated he and his family thought it was best for him to go back to Dr Clemencia Guardado

## 2025-02-15 ENCOUNTER — HOSPITAL ENCOUNTER (EMERGENCY)
Facility: HOSPITAL | Age: 85
Discharge: ANOTHER HOSPITAL | End: 2025-02-15
Admitting: EMERGENCY MEDICINE
Payer: MEDICARE

## 2025-02-15 ENCOUNTER — APPOINTMENT (EMERGENCY)
Dept: CT IMAGING | Facility: HOSPITAL | Age: 85
End: 2025-02-15
Payer: MEDICARE

## 2025-02-15 ENCOUNTER — APPOINTMENT (EMERGENCY)
Dept: RADIOLOGY | Facility: HOSPITAL | Age: 85
End: 2025-02-15
Payer: MEDICARE

## 2025-02-15 VITALS
OXYGEN SATURATION: 97 % | HEART RATE: 68 BPM | BODY MASS INDEX: 29.9 KG/M2 | RESPIRATION RATE: 24 BRPM | WEIGHT: 190.92 LBS | DIASTOLIC BLOOD PRESSURE: 92 MMHG | SYSTOLIC BLOOD PRESSURE: 206 MMHG | TEMPERATURE: 98.6 F

## 2025-02-15 DIAGNOSIS — R53.1 WEAKNESS: ICD-10-CM

## 2025-02-15 DIAGNOSIS — Z95.1 HX OF CABG: ICD-10-CM

## 2025-02-15 DIAGNOSIS — R79.89 ELEVATED D-DIMER: ICD-10-CM

## 2025-02-15 DIAGNOSIS — R79.89 ELEVATED TROPONIN: ICD-10-CM

## 2025-02-15 DIAGNOSIS — I25.10 CORONARY ARTERY DISEASE INVOLVING NATIVE CORONARY ARTERY OF NATIVE HEART WITHOUT ANGINA PECTORIS: Primary | ICD-10-CM

## 2025-02-15 DIAGNOSIS — R61 DIAPHORESIS: ICD-10-CM

## 2025-02-15 DIAGNOSIS — R79.89 ELEVATED BRAIN NATRIURETIC PEPTIDE (BNP) LEVEL: ICD-10-CM

## 2025-02-15 LAB
2HR DELTA HS TROPONIN: -75 NG/L
4HR DELTA HS TROPONIN: 73 NG/L
ALBUMIN SERPL BCG-MCNC: 3.9 G/DL (ref 3.5–5)
ALP SERPL-CCNC: 59 U/L (ref 34–104)
ALT SERPL W P-5'-P-CCNC: 9 U/L (ref 7–52)
ANION GAP SERPL CALCULATED.3IONS-SCNC: 7 MMOL/L (ref 4–13)
APTT PPP: 27 SECONDS (ref 23–34)
AST SERPL W P-5'-P-CCNC: 14 U/L (ref 13–39)
BACTERIA UR QL AUTO: NORMAL /HPF
BASOPHILS # BLD AUTO: 0.03 THOUSANDS/ΜL (ref 0–0.1)
BASOPHILS NFR BLD AUTO: 1 % (ref 0–1)
BILIRUB SERPL-MCNC: 0.33 MG/DL (ref 0.2–1)
BILIRUB UR QL STRIP: NEGATIVE
BNP SERPL-MCNC: 219 PG/ML (ref 0–100)
BUN SERPL-MCNC: 22 MG/DL (ref 5–25)
CALCIUM SERPL-MCNC: 8.8 MG/DL (ref 8.4–10.2)
CARDIAC TROPONIN I PNL SERPL HS: 465 NG/L (ref ?–50)
CARDIAC TROPONIN I PNL SERPL HS: 540 NG/L (ref ?–50)
CARDIAC TROPONIN I PNL SERPL HS: 613 NG/L (ref ?–50)
CHLORIDE SERPL-SCNC: 108 MMOL/L (ref 96–108)
CLARITY UR: CLEAR
CO2 SERPL-SCNC: 24 MMOL/L (ref 21–32)
COLOR UR: YELLOW
CREAT SERPL-MCNC: 1.09 MG/DL (ref 0.6–1.3)
D DIMER PPP FEU-MCNC: 1.04 UG/ML FEU
EOSINOPHIL # BLD AUTO: 0.05 THOUSAND/ΜL (ref 0–0.61)
EOSINOPHIL NFR BLD AUTO: 1 % (ref 0–6)
ERYTHROCYTE [DISTWIDTH] IN BLOOD BY AUTOMATED COUNT: 13.5 % (ref 11.6–15.1)
FLUAV AG UPPER RESP QL IA.RAPID: NEGATIVE
FLUBV AG UPPER RESP QL IA.RAPID: NEGATIVE
GFR SERPL CREATININE-BSD FRML MDRD: 62 ML/MIN/1.73SQ M
GLUCOSE SERPL-MCNC: 122 MG/DL (ref 65–140)
GLUCOSE UR STRIP-MCNC: NEGATIVE MG/DL
HCT VFR BLD AUTO: 36.4 % (ref 36.5–49.3)
HGB BLD-MCNC: 11.7 G/DL (ref 12–17)
HGB UR QL STRIP.AUTO: NEGATIVE
IMM GRANULOCYTES # BLD AUTO: 0.02 THOUSAND/UL (ref 0–0.2)
IMM GRANULOCYTES NFR BLD AUTO: 1 % (ref 0–2)
INR PPP: 0.96 (ref 0.85–1.19)
KETONES UR STRIP-MCNC: NEGATIVE MG/DL
LEUKOCYTE ESTERASE UR QL STRIP: NEGATIVE
LIPASE SERPL-CCNC: 13 U/L (ref 11–82)
LYMPHOCYTES # BLD AUTO: 0.97 THOUSANDS/ΜL (ref 0.6–4.47)
LYMPHOCYTES NFR BLD AUTO: 25 % (ref 14–44)
MCH RBC QN AUTO: 30.2 PG (ref 26.8–34.3)
MCHC RBC AUTO-ENTMCNC: 32.1 G/DL (ref 31.4–37.4)
MCV RBC AUTO: 94 FL (ref 82–98)
MONOCYTES # BLD AUTO: 0.45 THOUSAND/ΜL (ref 0.17–1.22)
MONOCYTES NFR BLD AUTO: 12 % (ref 4–12)
NEUTROPHILS # BLD AUTO: 2.3 THOUSANDS/ΜL (ref 1.85–7.62)
NEUTS SEG NFR BLD AUTO: 60 % (ref 43–75)
NITRITE UR QL STRIP: NEGATIVE
NON-SQ EPI CELLS URNS QL MICRO: NORMAL /HPF
NRBC BLD AUTO-RTO: 0 /100 WBCS
PH UR STRIP.AUTO: 7 [PH]
PLATELET # BLD AUTO: 208 THOUSANDS/UL (ref 149–390)
PMV BLD AUTO: 10.5 FL (ref 8.9–12.7)
POTASSIUM SERPL-SCNC: 4.1 MMOL/L (ref 3.5–5.3)
PROT SERPL-MCNC: 6.2 G/DL (ref 6.4–8.4)
PROT UR STRIP-MCNC: ABNORMAL MG/DL
PROTHROMBIN TIME: 13.3 SECONDS (ref 12.3–15)
RBC # BLD AUTO: 3.87 MILLION/UL (ref 3.88–5.62)
RBC #/AREA URNS AUTO: NORMAL /HPF
SARS-COV+SARS-COV-2 AG RESP QL IA.RAPID: NEGATIVE
SODIUM SERPL-SCNC: 139 MMOL/L (ref 135–147)
SP GR UR STRIP.AUTO: 1.01 (ref 1–1.03)
TSH SERPL DL<=0.05 MIU/L-ACNC: 0.85 UIU/ML (ref 0.45–4.5)
UROBILINOGEN UR STRIP-ACNC: <2 MG/DL
WBC # BLD AUTO: 3.82 THOUSAND/UL (ref 4.31–10.16)
WBC #/AREA URNS AUTO: NORMAL /HPF

## 2025-02-15 PROCEDURE — 83690 ASSAY OF LIPASE: CPT

## 2025-02-15 PROCEDURE — 96366 THER/PROPH/DIAG IV INF ADDON: CPT

## 2025-02-15 PROCEDURE — 71045 X-RAY EXAM CHEST 1 VIEW: CPT

## 2025-02-15 PROCEDURE — 93005 ELECTROCARDIOGRAM TRACING: CPT

## 2025-02-15 PROCEDURE — 96375 TX/PRO/DX INJ NEW DRUG ADDON: CPT

## 2025-02-15 PROCEDURE — 84484 ASSAY OF TROPONIN QUANT: CPT

## 2025-02-15 PROCEDURE — 84484 ASSAY OF TROPONIN QUANT: CPT | Performed by: INTERNAL MEDICINE

## 2025-02-15 PROCEDURE — 87811 SARS-COV-2 COVID19 W/OPTIC: CPT

## 2025-02-15 PROCEDURE — 85379 FIBRIN DEGRADATION QUANT: CPT

## 2025-02-15 PROCEDURE — 85730 THROMBOPLASTIN TIME PARTIAL: CPT

## 2025-02-15 PROCEDURE — 99285 EMERGENCY DEPT VISIT HI MDM: CPT

## 2025-02-15 PROCEDURE — 80053 COMPREHEN METABOLIC PANEL: CPT

## 2025-02-15 PROCEDURE — 99283 EMERGENCY DEPT VISIT LOW MDM: CPT | Performed by: INTERNAL MEDICINE

## 2025-02-15 PROCEDURE — 96365 THER/PROPH/DIAG IV INF INIT: CPT

## 2025-02-15 PROCEDURE — 71275 CT ANGIOGRAPHY CHEST: CPT

## 2025-02-15 PROCEDURE — 36415 COLL VENOUS BLD VENIPUNCTURE: CPT

## 2025-02-15 PROCEDURE — 81001 URINALYSIS AUTO W/SCOPE: CPT | Performed by: INTERNAL MEDICINE

## 2025-02-15 PROCEDURE — 96374 THER/PROPH/DIAG INJ IV PUSH: CPT

## 2025-02-15 PROCEDURE — 87804 INFLUENZA ASSAY W/OPTIC: CPT

## 2025-02-15 PROCEDURE — 83880 ASSAY OF NATRIURETIC PEPTIDE: CPT

## 2025-02-15 PROCEDURE — 84443 ASSAY THYROID STIM HORMONE: CPT

## 2025-02-15 PROCEDURE — 85025 COMPLETE CBC W/AUTO DIFF WBC: CPT

## 2025-02-15 PROCEDURE — 85610 PROTHROMBIN TIME: CPT

## 2025-02-15 RX ORDER — PANTOPRAZOLE SODIUM 40 MG/1
40 TABLET, DELAYED RELEASE ORAL
Status: DISCONTINUED | OUTPATIENT
Start: 2025-02-15 | End: 2025-02-15 | Stop reason: HOSPADM

## 2025-02-15 RX ORDER — HEPARIN SODIUM 10000 [USP'U]/100ML
3-20 INJECTION, SOLUTION INTRAVENOUS
Status: DISCONTINUED | OUTPATIENT
Start: 2025-02-15 | End: 2025-02-15 | Stop reason: HOSPADM

## 2025-02-15 RX ORDER — HEPARIN SODIUM 1000 [USP'U]/ML
4000 INJECTION, SOLUTION INTRAVENOUS; SUBCUTANEOUS EVERY 6 HOURS PRN
Status: DISCONTINUED | OUTPATIENT
Start: 2025-02-15 | End: 2025-02-15 | Stop reason: HOSPADM

## 2025-02-15 RX ORDER — ATORVASTATIN CALCIUM 40 MG/1
20 TABLET, FILM COATED ORAL
Status: DISCONTINUED | OUTPATIENT
Start: 2025-02-15 | End: 2025-02-15

## 2025-02-15 RX ORDER — METOPROLOL TARTRATE 25 MG/1
12.5 TABLET, FILM COATED ORAL EVERY 12 HOURS SCHEDULED
Status: DISCONTINUED | OUTPATIENT
Start: 2025-02-15 | End: 2025-02-15 | Stop reason: HOSPADM

## 2025-02-15 RX ORDER — HEPARIN SODIUM 1000 [USP'U]/ML
4000 INJECTION, SOLUTION INTRAVENOUS; SUBCUTANEOUS ONCE
Status: COMPLETED | OUTPATIENT
Start: 2025-02-15 | End: 2025-02-15

## 2025-02-15 RX ORDER — TAMSULOSIN HYDROCHLORIDE 0.4 MG/1
0.4 CAPSULE ORAL
Status: DISCONTINUED | OUTPATIENT
Start: 2025-02-15 | End: 2025-02-15 | Stop reason: HOSPADM

## 2025-02-15 RX ORDER — HYDRALAZINE HYDROCHLORIDE 20 MG/ML
10 INJECTION INTRAMUSCULAR; INTRAVENOUS EVERY 4 HOURS PRN
Status: DISCONTINUED | OUTPATIENT
Start: 2025-02-15 | End: 2025-02-15 | Stop reason: HOSPADM

## 2025-02-15 RX ORDER — LOSARTAN POTASSIUM 25 MG/1
25 TABLET ORAL 2 TIMES DAILY
Status: DISCONTINUED | OUTPATIENT
Start: 2025-02-15 | End: 2025-02-15 | Stop reason: HOSPADM

## 2025-02-15 RX ORDER — HEPARIN SODIUM 1000 [USP'U]/ML
2000 INJECTION, SOLUTION INTRAVENOUS; SUBCUTANEOUS EVERY 6 HOURS PRN
Status: DISCONTINUED | OUTPATIENT
Start: 2025-02-15 | End: 2025-02-15 | Stop reason: HOSPADM

## 2025-02-15 RX ORDER — ASPIRIN 81 MG/1
81 TABLET, CHEWABLE ORAL DAILY
Status: DISCONTINUED | OUTPATIENT
Start: 2025-02-16 | End: 2025-02-15 | Stop reason: HOSPADM

## 2025-02-15 RX ORDER — SODIUM CHLORIDE 9 MG/ML
3 INJECTION INTRAVENOUS
Status: DISCONTINUED | OUTPATIENT
Start: 2025-02-15 | End: 2025-02-15 | Stop reason: HOSPADM

## 2025-02-15 RX ORDER — ATORVASTATIN CALCIUM 40 MG/1
40 TABLET, FILM COATED ORAL
Status: DISCONTINUED | OUTPATIENT
Start: 2025-02-15 | End: 2025-02-15 | Stop reason: HOSPADM

## 2025-02-15 RX ORDER — ASPIRIN 325 MG
325 TABLET ORAL ONCE
Status: COMPLETED | OUTPATIENT
Start: 2025-02-15 | End: 2025-02-15

## 2025-02-15 RX ADMIN — TAMSULOSIN HYDROCHLORIDE 0.4 MG: 0.4 CAPSULE ORAL at 15:54

## 2025-02-15 RX ADMIN — METOPROLOL TARTRATE 12.5 MG: 25 TABLET, FILM COATED ORAL at 11:34

## 2025-02-15 RX ADMIN — HEPARIN SODIUM 4000 UNITS: 1000 INJECTION, SOLUTION INTRAVENOUS; SUBCUTANEOUS at 11:31

## 2025-02-15 RX ADMIN — HEPARIN SODIUM 11.1 UNITS/KG/HR: 10000 INJECTION, SOLUTION INTRAVENOUS at 11:31

## 2025-02-15 RX ADMIN — PANTOPRAZOLE SODIUM 40 MG: 40 TABLET, DELAYED RELEASE ORAL at 09:11

## 2025-02-15 RX ADMIN — HYDRALAZINE HYDROCHLORIDE 10 MG: 20 INJECTION INTRAMUSCULAR; INTRAVENOUS at 15:54

## 2025-02-15 RX ADMIN — LOSARTAN POTASSIUM 25 MG: 25 TABLET, FILM COATED ORAL at 09:11

## 2025-02-15 RX ADMIN — ASPIRIN 325 MG ORAL TABLET 325 MG: 325 PILL ORAL at 05:40

## 2025-02-15 RX ADMIN — IOHEXOL 85 ML: 350 INJECTION, SOLUTION INTRAVENOUS at 06:44

## 2025-02-15 NOTE — ASSESSMENT & PLAN NOTE
Elevated troponin, patient had abnormal sensation in his left arm after going to the bathroom overnight.  Patient denies chest pain    Endorses increasing fatigue with activity for the last 1 to 2 weeks.    The patient was in contact with his cardiologist while he was in the [facility] emergency room, according to the patient they recommended further evaluation with cardiac cath.    Patient is being transferred to [facility] for continuity of care, patient follows with [facility] cardiology.    Start heparin drip/ACS protocol, add Lopressor 12.5 mg p.o. every 12, monitor on telemetry    Lipitor 40 mg    Aspirin 81 mg daily    Patient is currently asymptomatic, blood pressure is uncontrolled.

## 2025-02-15 NOTE — ASSESSMENT & PLAN NOTE
Lab Results   Component Value Date    EGFR 62 02/15/2025    EGFR 53 (L) 05/01/2024    EGFR 61 04/06/2024    CREATININE 1.09 02/15/2025    CREATININE 1.35 (H) 05/01/2024    CREATININE 1.2 04/06/2024   Renal function is at baseline

## 2025-02-15 NOTE — ED PROVIDER NOTES
"Time reflects when diagnosis was documented in both MDM as applicable and the Disposition within this note       Time User Action Codes Description Comment    2/15/2025  7:55 AM Kamari Almaguer [I25.10] Coronary artery disease involving native coronary artery of native heart without angina pectoris     2/15/2025  7:55 AM Kamari Almaguer [R79.89] Elevated troponin     2/15/2025  8:20 AM Kamari Almaguer [R53.1] Weakness     2/15/2025  8:20 AM Kamari Almaguer [R61] Diaphoresis     2/15/2025  8:20 AM Kamari Almaguer [R79.89] Elevated d-dimer     2/15/2025  8:20 AM Kamari Almaguer [R79.89] Elevated brain natriuretic peptide (BNP) level     2/15/2025  8:20 AM Kamari Almaguer [Z95.1] Hx of CABG           ED Disposition       ED Disposition   Transfer to Another Facility - Out of Network    Condition   --    Date/Time   Sat Feb 15, 2025 11:59 AM    Comment   Felipe Paul should be transferred out to [facility].               Assessment & Plan       Medical Decision Making  Medical complexity: 84-year-old male with known history of CABG now presenting with episode of lightheadedness/weakness/diaphoresis that occurred about an hour and half before he arrived in the emergency department.  Symptoms were subsiding by the time he got into the waiting room.  And now he states that he is essentially feeling more or less like his normal self except for being \"tired\".  Patient is denying any chest pain or shortness of breath, however given his history with severe risk factors for CAD, will screen for ACS with EKG and serum troponin biomarker.  Will evaluate metabolites for signs of acute metabolic disturbances including acute renal injury, or acute liver injury.  Will evaluate chest x-ray for signs of pneumonia, focal infiltrate, pneumothorax, or cardiomegaly.  Will evaluate abdominal lab work to look for signs of leukocytosis, left shift, acute liver enzyme derangements.  Will check lipase for signs of " pancreatitis given the upper abdominal pain and nausea with lightheadedness.    Reassessment/disposition: Unfortunately, the patient was found to have significantly elevated troponin values which continued to increase on subsequent evaluation.  Given the patient's symptoms today, in addition to the rising troponin, likely low-level NSTEMI.  Patient was signed out prior to final disposition however leaning towards likely transfer to [facility] for continuity of care given that this is where patient sees cardiology and has already called his cardiologist this morning who recommends admission for ongoing monitoring and possible interventions if return of symptoms or labs were to worsen.  Patient was found to have uncontrolled hypertension here in the emergency department which is likely making his symptoms worse.  Will need to be addressed by inpatient team.  Otherwise he states that he has remained asymptomatic through his stay here in the ED.  Please see ED handoff note if there is any significant change to patient's course.    Amount and/or Complexity of Data Reviewed  Labs: ordered. Decision-making details documented in ED Course.  Radiology: ordered.    Risk  OTC drugs.  Prescription drug management.        ED Course as of 02/15/25 2216   Sat Feb 15, 2025   0446 ECG interpreted by myself.  Date: 2/15/2025.  Time: 0441.  Rate: 64 bpm.  Axis: Normal.  Rhythm: Regular.  This is a right bundle branch block pattern.  There are no ST elevations or depressions evident.  This is an abnormal ECG right bundle branch block is new as of 3/19/2021   0521 hs TnI 0hr(!): 540   0651 hs TnI 0hr(!): 540  I reached out to cardiology on-call (Dr. Fenton), who advised to trend troponin, if troponin is escalating, then start heparin drip and admit with medical management here at this hospital.  If going up very quickly and significantly or if patient is having new symptoms such as chest pain, or if there are significant EKG changes, then may  need to consider transfer however current symptoms and clinical picture points towards medical management at [facility] as appropriate.   0705 Repeat EKG interpreted by myself.  Date: 2/15/2025.  Time: 0642.  Rate: 71 bpm.  Axis: Normal.  This is a right bundle branch block pattern with no ST elevations or depressions evident.  This is similar to prior from 2 hours ago.       Medications   aspirin tablet 325 mg (325 mg Oral Given 2/15/25 0540)   iohexol (OMNIPAQUE) 350 MG/ML injection (MULTI-DOSE) 85 mL (85 mL Intravenous Given 2/15/25 0644)   heparin (porcine) injection 4,000 Units (4,000 Units Intravenous Given 2/15/25 1131)       ED Risk Strat Scores   HEART Risk Score      Flowsheet Row Most Recent Value   Heart Score Risk Calculator    History 1 Filed at: 02/15/2025 0654   ECG 1 Filed at: 02/15/2025 0654   Age 2 Filed at: 02/15/2025 0654   Risk Factors 2 Filed at: 02/15/2025 0654   Troponin 2 Filed at: 02/15/2025 0654   HEART Score 8 Filed at: 02/15/2025 0654          HEART Risk Score      Flowsheet Row Most Recent Value   Heart Score Risk Calculator    History 1 Filed at: 02/15/2025 0654   ECG 1 Filed at: 02/15/2025 0654   Age 2 Filed at: 02/15/2025 0654   Risk Factors 2 Filed at: 02/15/2025 0654   Troponin 2 Filed at: 02/15/2025 0654   HEART Score 8 Filed at: 02/15/2025 0654                              SBIRT 22yo+      Flowsheet Row Most Recent Value   Initial Alcohol Screen: US AUDIT-C     1. How often do you have a drink containing alcohol? 0 Filed at: 02/15/2025 0420   2. How many drinks containing alcohol do you have on a typical day you are drinking?  0 Filed at: 02/15/2025 0420   3a. Male UNDER 65: How often do you have five or more drinks on one occasion? 0 Filed at: 02/15/2025 0420   3b. FEMALE Any Age, or MALE 65+: How often do you have 4 or more drinks on one occassion? 0 Filed at: 02/15/2025 0420   Audit-C Score 0 Filed at: 02/15/2025 0420   GODWIN: How many times in the past year have you...     Used an illegal drug or used a prescription medication for non-medical reasons? Never Filed at: 02/15/2025 0420                            History of Present Illness       Chief Complaint   Patient presents with    Abdominal Pain     Woke up around 2am. Weak nausea no vomiting clammy, lower abdominal pain. Passed gas and felt somewhat better. History of gallbladder removal. Deneis CP and SOB       Past Medical History:   Diagnosis Date    Coronary artery disease     Hypertension     Kidney stones       Past Surgical History:   Procedure Laterality Date    CARDIAC SURGERY      CHOLECYSTECTOMY      COLONOSCOPY  06/24/2002    Dr Zamora - negative    COLONOSCOPY  06/24/1999    Dr Zamora - benign polyp removed, diverticulosis, hypertrophied anal papilla    COLONOSCOPY  03/19/2007    Dr Zamora - Diverticulosis    COLONOSCOPY  04/30/2012    Dr Zamora - diverticulosis    COLONOSCOPY  03/27/2017    Dr Hughes - prostate nodule and increased firmaness of the prostate. 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    FL RETROGRADE PYELOGRAM  03/20/2021    AL CYSTOURETHROSCOPY,URETER CATHETER Left 03/20/2021    Procedure: CYSTOSCOPY RETROGRADE PYELOGRAM WITH INSERTION STENT URETERAL;  Surgeon: Kendall Alcala MD;  Location: [facility];  Service: Urology    TONSILLECTOMY        No family history on file.   Social History     Tobacco Use    Smoking status: Never    Smokeless tobacco: Never   Vaping Use    Vaping status:  Never Used   Substance Use Topics    Alcohol use: Yes     Alcohol/week: 4.0 standard drinks of alcohol     Types: 4 Glasses of wine per week    Drug use: Never      E-Cigarette/Vaping    E-Cigarette Use Never User       E-Cigarette/Vaping Substances      I have reviewed and agree with the history as documented.     This is an 84-year-old male who has a known history of CAD status post CABG, CKD stage III, hypertension, and history of kidney stones.  He presents today after an episode of dizziness/lightheadedness, weakness, diaphoresis, and abdominal pain.  The episode occurred shortly after he had awoken to urinate.  He urinated without any difficulty, and went back to his bed whenever he suddenly began to feel very nauseous, lightheaded, generally weak.  He awoke his wife about 20 minutes after the symptoms started who found him to be diaphoretic and pale.  Patient then slowly began to improve.  Currently he states that he feels much better, but not completely back to his baseline yet.  During the episode he was experiencing some abdominal discomfort but finds it hard to localize exactly where it was.  He states that he was able to pass gas during the episode and seemed to feel a bit better.  Patient denies any hematuria.  He denies any CVA area pain as well and states that this does not feel like prior kidney stones that he has had in the past.  Patient is denying completely any chest pain throughout the entire event, and is denying any shortness of breath as well.  He states that he has felt like he has been in his normal state of health recently.  He does note that a few weeks ago he had 1 episode of gross hematuria.        Review of Systems   Constitutional:  Positive for diaphoresis and fatigue. Negative for chills and fever.   HENT:  Negative for congestion and sore throat.    Eyes:  Negative for pain and visual disturbance.   Respiratory:  Negative for cough, chest tightness and shortness of breath.     Cardiovascular:  Negative for chest pain and palpitations.   Gastrointestinal:  Positive for constipation and nausea. Negative for abdominal pain, blood in stool, diarrhea and vomiting.   Genitourinary:  Negative for dysuria, flank pain and hematuria.   Musculoskeletal:  Negative for arthralgias, back pain and neck pain.   Skin:  Negative for color change and rash.   Neurological:  Positive for dizziness, weakness and light-headedness. Negative for syncope.   Hematological:  Negative for adenopathy. Does not bruise/bleed easily.   All other systems reviewed and are negative.          Objective       ED Triage Vitals [02/15/25 6363]   Temperature Pulse Blood Pressure Respirations SpO2 Patient Position - Orthostatic VS   98 °F (36.7 °C) 68 167/81 19 96 % Lying      Temp Source Heart Rate Source BP Location FiO2 (%) Pain Score    Temporal Monitor Right arm -- No Pain      Vitals      Date and Time Temp Pulse SpO2 Resp BP Pain Score FACES Pain Rating User   02/15/25 1700 98.6 °F (37 °C) 68 97 % 24 206/92 -- -- [facility]   02/15/25 1630 -- 67 97 % 24 185/81 -- -- [facility]   02/15/25 1601 -- 63 98 % 30 186/91 -- -- [facility]   02/15/25 1553 -- 61 96 % 23 199/88 -- -- [facility]   02/15/25 1511 -- 64 97 % 28 229/95 -- -- [facility]   02/15/25 1441 -- 60 97 % 25 198/85 -- -- [facility]   02/15/25 1400 -- 61 96 % 18 177/93 -- -- [facility]   02/15/25 1300 98.2 °F (36.8 °C) 58 98 % 20 201/89 No Pain -- [facility]   02/15/25 1134 -- 64 -- -- 201/84 -- -- [facility]   02/15/25 1106 -- 64 97 % 64 -- -- -- [facility]   02/15/25 1100 98 °F (36.7 °C) 62 99 % 20 198/91 No Pain -- [facility]   02/15/25 0912 97.5 °F (36.4 °C) 58 98 % 20 186/88 No Pain -- [facility]   02/15/25 0900 -- 58 98 % 45 -- -- -- [facility]   02/15/25 0857 97.5 °F (36.4 °C) 59 99 % 18 135/102 No Pain -- EMR   02/15/25 0812 -- 56 95 % -- 167/79 No Pain -- EMR   02/15/25 0800 97.3 °F (36.3 °C) 59 98 % 20 197/81 -- -- EMR   02/15/25 0730 -- -- -- -- -- No Pain -- EMR   02/15/25 0700 -- 60 97 % 20 167/77 No Pain -- CP   02/15/25 0545 98.3 °F (36.8  °C) 74 97 % 18 204/78 No Pain --    02/15/25 0417 98 °F (36.7 °C) 68 96 % 19 167/81 No Pain -- CP            Physical Exam  Vitals and nursing note reviewed.   Constitutional:       General: He is not in acute distress.     Appearance: He is well-developed. He is obese. He is not toxic-appearing or diaphoretic.   HENT:      Head: Normocephalic and atraumatic.      Right Ear: External ear normal.      Left Ear: External ear normal.      Nose: Nose normal. No congestion or rhinorrhea.      Mouth/Throat:      Mouth: Mucous membranes are moist.      Pharynx: No oropharyngeal exudate or posterior oropharyngeal erythema.   Eyes:      General: No scleral icterus.     Extraocular Movements: Extraocular movements intact.      Conjunctiva/sclera: Conjunctivae normal.      Pupils: Pupils are equal, round, and reactive to light.   Cardiovascular:      Rate and Rhythm: Normal rate and regular rhythm.      Pulses: Normal pulses.      Heart sounds: No murmur heard.  Pulmonary:      Effort: Pulmonary effort is normal. No respiratory distress.      Breath sounds: Normal breath sounds. No wheezing or rales.   Abdominal:      General: Abdomen is protuberant. There is no distension.      Palpations: Abdomen is soft. There is no mass.      Tenderness: There is no abdominal tenderness. There is no right CVA tenderness, left CVA tenderness or guarding.      Hernia: A hernia is present. Hernia is present in the ventral area.   Musculoskeletal:         General: No swelling. Normal range of motion.      Cervical back: Normal range of motion and neck supple.      Right lower leg: No edema.      Left lower leg: No edema.   Skin:     General: Skin is warm and dry.      Capillary Refill: Capillary refill takes less than 2 seconds.   Neurological:      General: No focal deficit present.      Mental Status: He is alert and oriented to person, place, and time.   Psychiatric:         Mood and Affect: Mood normal.         Behavior: Behavior normal.          Results Reviewed       Procedure Component Value Units Date/Time    Urine Microscopic [[id number]]  (Normal) Collected: 02/15/25 1121    Lab Status: Final result Specimen: Urine, Clean Catch Updated: 02/15/25 1138     RBC, UA None Seen /hpf      WBC, UA None Seen /hpf      Epithelial Cells None Seen /hpf      Bacteria, UA None Seen /hpf     UA w Reflex to Microscopic w Reflex to Culture [[id number]]  (Abnormal) Collected: 02/15/25 1121    Lab Status: Final result Specimen: Urine, Clean Catch Updated: 02/15/25 1135     Color, UA Yellow     Clarity, UA Clear     Specific Gravity, UA 1.010     pH, UA 7.0     Leukocytes, UA Negative     Nitrite, UA Negative     Protein, UA Trace mg/dl      Glucose, UA Negative mg/dl      Ketones, UA Negative mg/dl      Urobilinogen, UA <2.0 mg/dl      Bilirubin, UA Negative     Occult Blood, UA Negative    HS Troponin I 4hr [[id number]]  (Abnormal) Collected: 02/15/25 0910    Lab Status: Final result Specimen: Blood from Arm, Right Updated: 02/15/25 0951     hs TnI 4hr 613 ng/L      Delta 4hr hsTnI 73 ng/L     HS Troponin I 2hr [[id number]]  (Abnormal) Collected: 02/15/25 0643    Lab Status: Final result Specimen: Blood from Arm, Right Updated: 02/15/25 0714     hs TnI 2hr 465 ng/L      Delta 2hr hsTnI -75 ng/L     APTT [[id number]]  (Normal) Collected: 02/15/25 0522    Lab Status: Final result Specimen: Blood Updated: 02/15/25 0646     PTT 27 seconds     Protime-INR [[id number]]  (Normal) Collected: 02/15/25 0522    Lab Status: Final result Specimen: Blood Updated: 02/15/25 0646     Protime 13.3 seconds      INR 0.96    Narrative:      INR Therapeutic Range    Indication                                             INR Range      Atrial Fibrillation                                               2.0-3.0  Hypercoagulable State                                    2.0.2.3  Left Ventricular Asist Device                            2.0-3.0  Mechanical Heart Valve                                   -    Aortic(with afib, MI, embolism, HF, LA enlargement,    and/or coagulopathy)                                     2.0-3.0 (2.5-3.5)     Mitral                                                             2.5-3.5  Prosthetic/Bioprosthetic Heart Valve               2.0-3.0  Venous thromboembolism (VTE: VT, PE        2.0-3.0    D-dimer, quantitative [[id number]]  (Abnormal) Collected: 02/15/25 0446    Lab Status: Final result Specimen: Blood Updated: 02/15/25 0542     D-Dimer, Quant 1.04 ug/ml FEU     Narrative:      In the evaluation for possible pulmonary embolism, in the appropriate (Well's Score of 4 or less) patient, the age adjusted d-dimer cutoff for this patient can be calculated as:    Age x 0.01 (in ug/mL) for Age-adjusted D-dimer exclusion threshold for a patient over 50 years.    TSH, 3rd generation with Free T4 reflex [[id number]]  (Normal) Collected: 02/15/25 0443    Lab Status: Final result Specimen: Blood from Arm, Right Updated: 02/15/25 0529     TSH 3RD GENERATON 0.854 uIU/mL     B-Type Natriuretic Peptide(BNP) [[id number]]  (Abnormal) Collected: 02/15/25 0443    Lab Status: Final result Specimen: Blood from Arm, Right Updated: 02/15/25 0520      pg/mL     HS Troponin 0hr (reflex protocol) [[id number]]  (Abnormal) Collected: 02/15/25 0443    Lab Status: Final result Specimen: Blood from Arm, Right Updated: 02/15/25 0520     hs TnI 0hr 540 ng/L     FLU/COVID Rapid Antigen (30 min. TAT) - Preferred screening test in ED [[id number]]  (Normal) Collected: 02/15/25 0443    Lab Status: Final result Specimen: Nares from Nose Updated: 02/15/25 0515     SARS COV Rapid Antigen Negative     Influenza A Rapid Antigen Negative     Influenza B Rapid Antigen Negative    Narrative:      This test has been performed using the [employer] Mirella 2 FLU+SARS Antigen test under the Emergency Use Authorization (EUA). This test has been validated by the  and verified by the performing laboratory. The Mirella uses  lateral flow immunofluorescent sandwich assay to detect SARS-COV, Influenza A and Influenza B Antigen.     The [employer] Mirella 2 SARS Antigen test does not differentiate between SARS-CoV and SARS-CoV-2.     Negative results are presumptive and may be confirmed with a molecular assay, if necessary, for patient management. Negative results do not rule out SARS-CoV-2 or influenza infection and should not be used as the sole basis for treatment or patient management decisions. A negative test result may occur if the level of antigen in a sample is below the limit of detection of this test.     Positive results are indicative of the presence of viral antigens, but do not rule out bacterial infection or co-infection with other viruses.     All test results should be used as an adjunct to clinical observations and other information available to the provider.    FOR PEDIATRIC PATIENTS - copy/paste COVID Guidelines URL to browser: [email or web address]    Lipase [[id number]]  (Normal) Collected: 02/15/25 0443    Lab Status: Final result Specimen: Blood from Arm, Right Updated: 02/15/25 0515     Lipase 13 u/L     Comprehensive metabolic panel [[id number]]  (Abnormal) Collected: 02/15/25 0443    Lab Status: Final result Specimen: Blood from Arm, Right Updated: 02/15/25 0515     Sodium 139 mmol/L      Potassium 4.1 mmol/L      Chloride 108 mmol/L      CO2 24 mmol/L      ANION GAP 7 mmol/L      BUN 22 mg/dL      Creatinine 1.09 mg/dL      Glucose 122 mg/dL      Calcium 8.8 mg/dL      AST 14 U/L      ALT 9 U/L      Alkaline Phosphatase 59 U/L      Total Protein 6.2 g/dL      Albumin 3.9 g/dL      Total Bilirubin 0.33 mg/dL      eGFR 62 ml/min/1.73sq m     Narrative:      National Kidney Disease Foundation guidelines for Chronic Kidney Disease (CKD):     Stage 1 with normal or high GFR (GFR > 90 mL/min/1.73 square meters)    Stage 2 Mild CKD (GFR = 60-89 mL/min/1.73 square meters)     Stage 3A Moderate CKD (GFR = 45-59 mL/min/1.73 square meters)    Stage 3B Moderate CKD (GFR = 30-44 mL/min/1.73 square meters)    Stage 4 Severe CKD (GFR = 15-29 mL/min/1.73 square meters)    Stage 5 End Stage CKD (GFR <15 mL/min/1.73 square meters)  Note: GFR calculation is accurate only with a steady state creatinine    CBC and differential [[id number]]  (Abnormal) Collected: 02/15/25 0443    Lab Status: Final result Specimen: Blood from Arm, Right Updated: 02/15/25 0458     WBC 3.82 Thousand/uL      RBC 3.87 Million/uL      Hemoglobin 11.7 g/dL      Hematocrit 36.4 %      MCV 94 fL      MCH 30.2 pg      MCHC 32.1 g/dL      RDW 13.5 %      MPV 10.5 fL      Platelets 208 Thousands/uL      nRBC 0 /100 WBCs      Segmented % 60 %      Immature Grans % 1 %      Lymphocytes % 25 %      Monocytes % 12 %      Eosinophils Relative 1 %      Basophils Relative 1 %      Absolute Neutrophils 2.30 Thousands/µL      Absolute Immature Grans 0.02 Thousand/uL      Absolute Lymphocytes 0.97 Thousands/µL      Absolute Monocytes 0.45 Thousand/µL      Eosinophils Absolute 0.05 Thousand/µL      Basophils Absolute 0.03 Thousands/µL             CTA chest pe study   Final Interpretation by Shane Shelby MD (02/15 0723)      No evidence of pulmonary embolism.      Bronchial thickening suggest bronchitis.      No focal infiltrate.      Buckle deformities of the ribs is consistent with rib fractures. Some appear chronic. Others are more in indeterminate. Finding should be correlated with any history of trauma or rib tenderness.      7 mm left lower lobe nodule.      Based on current Fleischner Society 2017 Guidelines on incidental pulmonary nodule, follow-up non-contrast CT is recommended at 6-12 months from the initial examination and, if stable at that time, an additional follow-up is recommended for 18-24 months    from the initial examination.      This was discussed with Dr. Almaguer at 7:19 a.m.                        Workstation  performed: [id number]         X-ray chest 1 view portable   Final Interpretation by Xavier Mcfadden MD (02/15 1111)      No acute cardiopulmonary disease.            Workstation performed: [id number]             Procedures    ED Medication and Procedure Management   Prior to Admission Medications   Prescriptions Last Dose Informant Patient Reported? Taking?   Silodosin (Rapaflo) 8 MG CAPS   Yes No   Sig: Take by mouth   aspirin (ECOTRIN) 325 mg EC tablet   Yes No   Sig: Take 325 mg by mouth every 6 (six) hours as needed for mild pain   atorvastatin (Lipitor) 20 mg tablet   Yes No   Sig: Take 1 tablet by mouth daily at bedtime   cholecalciferol (VITAMIN D3) 1,000 units tablet   Yes No   Sig: Take 1,000 Units by mouth daily   cyanocobalamin (VITAMIN B-12) 100 mcg tablet   Yes No   Sig: Take by mouth daily   losartan (COZAAR) 25 mg tablet   Yes No   Sig: Take 25 mg by mouth 2 (two) times a day   omeprazole (PriLOSEC) 20 mg delayed release capsule   Yes No   Sig: Take 20 mg by mouth daily   ondansetron (ZOFRAN) 4 mg tablet   No No   Sig: Take 1 tablet (4 mg total) by mouth every 8 (eight) hours as needed for nausea or vomiting   tadalafil (CIALIS) 5 MG tablet   Yes No   Sig: Take 5 mg by mouth daily as needed for erectile dysfunction      Facility-Administered Medications: None     Discharge Medication List as of 2/15/2025  6:05 PM        CONTINUE these medications which have NOT CHANGED    Details   aspirin (ECOTRIN) 325 mg EC tablet Take 325 mg by mouth every 6 (six) hours as needed for mild pain, Historical Med      atorvastatin (Lipitor) 20 mg tablet Take 1 tablet by mouth daily at bedtime, Starting Fri 10/16/2020, Historical Med      cholecalciferol (VITAMIN D3) 1,000 units tablet Take 1,000 Units by mouth daily, Historical Med      cyanocobalamin (VITAMIN B-12) 100 mcg tablet Take by mouth daily, Historical Med      losartan (COZAAR) 25 mg tablet Take 25 mg by mouth 2 (two) times a day, Historical  Med      omeprazole (PriLOSEC) 20 mg delayed release capsule Take 20 mg by mouth daily, Historical Med      ondansetron (ZOFRAN) 4 mg tablet Take 1 tablet (4 mg total) by mouth every 8 (eight) hours as needed for nausea or vomiting, Starting Sat 3/20/2021, Normal      Silodosin (Rapaflo) 8 MG CAPS Take by mouth, Historical Med      tadalafil (CIALIS) 5 MG tablet Take 5 mg by mouth daily as needed for erectile dysfunction, Historical Med           No discharge procedures on file.  ED SEPSIS DOCUMENTATION   Time reflects when diagnosis was documented in both MDM as applicable and the Disposition within this note       Time User Action Codes Description Comment    2/15/2025  7:55 AM Kamari Almaguer [I25.10] Coronary artery disease involving native coronary artery of native heart without angina pectoris     2/15/2025  7:55 AM Kamari Almaguer [R79.89] Elevated troponin     2/15/2025  8:20 AM Kamari Almaguer [R53.1] Weakness     2/15/2025  8:20 AM Kamari Almaguer [R61] Diaphoresis     2/15/2025  8:20 AM Kamari Almaguer [R79.89] Elevated d-dimer     2/15/2025  8:20 AM Kamari Almaguer [R79.89] Elevated brain natriuretic peptide (BNP) level     2/15/2025  8:20 AM Kamari Almaguer [Z95.1] Hx of CABG                  Kamari Almaguer MD  02/15/25 [id number]

## 2025-02-15 NOTE — ASSESSMENT & PLAN NOTE
Patient stated that baseline systolic blood pressure usually ranges 140-160.    Blood pressure markedly elevated, add Lopressor 12.5 mg p.o. twice daily, monitor.

## 2025-02-15 NOTE — EMTALA/ACUTE CARE TRANSFER
Former[facility] EMERGENCY DEPARTMENT  [address] [address]  Dept: [phone]      EMTALA TRANSFER CONSENT    NAME Felipe PEREZ 1940                              MRN [id number]    I have been informed of my rights regarding examination, treatment, and transfer   by Dr. Jordon Delacruz MD    Benefits: Specialized equipment and/or services available at the receiving facility (Include comment)________________________, Continuity of care (Cardiology, cath lab)    Risks: Potential for delay in receiving treatment, Loss of IV, Potential deterioration of medical condition, Increased discomfort during transfer, Possible worsening of condition or death during transfer      Consent for Transfer:  I acknowledge that my medical condition has been evaluated and explained to me by the emergency department physician or other qualified medical person and/or my attending physician, who has recommended that I be transferred to the service of  Accepting Physician: Dr. Adilene Rivas at Accepting Facility Name, City & State : [facility]. The above potential benefits of such transfer, the potential risks associated with such transfer, and the probable risks of not being transferred have been explained to me, and I fully understand them.  The doctor has explained that, in my case, the benefits of transfer outweigh the risks.  I agree to be transferred.    I authorize the performance of emergency medical procedures and treatments upon me in both transit and upon arrival at the receiving facility.  Additionally, I authorize the release of any and all medical records to the receiving facility and request they be transported with me, if possible.  I understand that the safest mode of transportation during a medical emergency is an ambulance and that the Hospital advocates the use of this mode of transport. Risks of traveling to the receiving facility by car,  including absence of medical control, life sustaining equipment, such as oxygen, and medical personnel has been explained to me and I fully understand them.    (KATLIN CORRECT BOX BELOW)  [  ]  I consent to the stated transfer and to be transported by ambulance/helicopter.  [  ]  I consent to the stated transfer, but refuse transportation by ambulance and accept full responsibility for my transportation by car.  I understand the risks of non-ambulance transfers and I exonerate the Hospital and its staff from any deterioration in my condition that results from this refusal.    X___________________________________________    DATE  02/15/25  TIME________  Signature of patient or legally responsible individual signing on patient behalf           RELATIONSHIP TO PATIENT_________________________          Provider Certification    NAME Felipe Paul                                         1940                              MRN [id number]    A medical screening exam was performed on the above named patient.  Based on the examination:    Condition Necessitating Transfer The primary encounter diagnosis was Coronary artery disease involving native coronary artery of native heart without angina pectoris. Diagnoses of Elevated troponin, Weakness, Diaphoresis, Elevated d-dimer, Elevated brain natriuretic peptide (BNP) level, and Hx of CABG were also pertinent to this visit.    Patient Condition: The patient has been stabilized such that within reasonable medical probability, no material deterioration of the patient condition or the condition of the unborn child(kevin) is likely to result from the transfer    Reason for Transfer: Level of Care needed not available at this facility    Transfer Requirements: Facility [facility]   Space available and qualified personnel available for treatment as acknowledged by [phone]  Agreed to accept transfer and to provide appropriate medical treatment as acknowledged by       Dr. Head  Evelyn  Appropriate medical records of the examination and treatment of the patient are provided at the time of transfer   STAFF INITIAL WHEN COMPLETED _______  Transfer will be performed by qualified personnel from [facility]  and appropriate transfer equipment as required, including the use of necessary and appropriate life support measures.    Provider Certification: I have examined the patient and explained the following risks and benefits of being transferred/refusing transfer to the patient/family:  General risk, such as traffic hazards, adverse weather conditions, rough terrain or turbulence, possible failure of equipment (including vehicle or aircraft), or consequences of actions of persons outside the control of the transport personnel, Unanticipated needs of medical equipment and personnel during transport, Risk of worsening condition, The possibility of a transport vehicle being unavailable      Based on these reasonable risks and benefits to the patient and/or the unborn child(kevin), and based upon the information available at the time of the patient’s examination, I certify that the medical benefits reasonably to be expected from the provision of appropriate medical treatments at another medical facility outweigh the increasing risks, if any, to the individual’s medical condition, and in the case of labor to the unborn child, from effecting the transfer.    X____________________________________________ DATE 02/15/25        TIME_______      ORIGINAL - SEND TO MEDICAL RECORDS   COPY - SEND WITH PATIENT DURING TRANSFER

## 2025-02-15 NOTE — CONSULTS
Consultation - Hospitalist   Name: Felipe Paul 84 y.o. male I MRN: [id number]  Unit/Bed#: RM10 I Date of Admission: 2/15/2025   Date of Service: 2/15/2025 I Hospital Day: 0   Consult to internal medicine  Consult performed by: Moises Bird DO  Consult ordered by: Kamari Almaguer MD        Physician Requesting Evaluation: Jordon Delacruz MD   Reason for Evaluation / Principal Problem: Atypical chest pain, elevated troponin    Assessment & Plan  Elevated troponin  Elevated troponin, patient had abnormal sensation in his left arm after going to the bathroom overnight.  Patient denies chest pain    Endorses increasing fatigue with activity for the last 1 to 2 weeks.    The patient was in contact with his cardiologist while he was in the [facility] emergency room, according to the patient they recommended further evaluation with cardiac cath.    Patient is being transferred to [facility] for continuity of care, patient follows with [facility] cardiology.    Start heparin drip/ACS protocol, add Lopressor 12.5 mg p.o. every 12, monitor on telemetry    Lipitor 40 mg    Aspirin 81 mg daily    Patient is currently asymptomatic, blood pressure is uncontrolled.  Coronary artery disease involving native coronary artery of native heart without angina pectoris  Known coronary disease      Hypertensive urgency  Patient stated that baseline systolic blood pressure usually ranges 140-160.    Blood pressure markedly elevated, add Lopressor 12.5 mg p.o. twice daily, monitor.  Chronic kidney disease, stage 3b (HCC)  Lab Results   Component Value Date    EGFR 62 02/15/2025    EGFR 53 (L) 05/01/2024    EGFR 61 04/06/2024    CREATININE 1.09 02/15/2025    CREATININE 1.35 (H) 05/01/2024    CREATININE 1.2 04/06/2024   Renal function is at baseline  I have discussed the above management plan in detail with the primary service.   Please contact the SecureChat role for the Hospitalist service with  any questions/concerns.  Discussion with family: Updated  (wife) via phone.    History of Present Illness   Chief Complaint: Abnormal feeling left upper extremity after going to the bathroom    Felipe Paul is a 84 y.o. male with a PMH of coronary artery disease, hypertension who presents with abnormal feeling in his left upper extremity.  Patient states that he awoke to go to the bathroom overnight, this is typical, however when he returned to bed he had an abnormal feeling in his left upper extremity, it lasted approximately 20 minutes, he informed his wife and they proceeded to the hospital.    Patient's symptoms had resolved by time he arrived, patient reports no difficulty passing urine, has some constipation but was able to have a formed bowel movement today.    No chest pain no shortness of breath, no fever no chills.  Patient does endorse increased fatigue with activity for 1 to 2 weeks.    Review of Systems   All other systems reviewed and are negative.      Historical Information   Past Medical History:   Diagnosis Date    Coronary artery disease     Hypertension     Kidney stones      Past Surgical History:   Procedure Laterality Date    CARDIAC SURGERY      CHOLECYSTECTOMY      COLONOSCOPY  06/24/2002    Dr Zamora - negative    COLONOSCOPY  06/24/1999    Dr Zamora - benign polyp removed, diverticulosis, hypertrophied anal papilla    COLONOSCOPY  03/19/2007    Dr Zamora - Diverticulosis    COLONOSCOPY  04/30/2012    Dr Zamora - diverticulosis    COLONOSCOPY  03/27/2017    Dr Hughes - prostate nodule and increased firmaness of the prostate. 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    FL RETROGRADE PYELOGRAM  03/20/2021    TX CYSTOURETHROSCOPY,URETER CATHETER Left 03/20/2021    Procedure: CYSTOSCOPY RETROGRADE PYELOGRAM WITH INSERTION STENT URETERAL;  Surgeon: Kendall Alcala MD;  Location: [facility];  Service: Urology    TONSILLECTOMY       Social History     Tobacco Use    Smoking status: Never    Smokeless tobacco: Never   Vaping Use    Vaping status: Never Used   Substance and Sexual Activity    Alcohol use: Yes     Alcohol/week: 4.0 standard drinks of alcohol     Types: 4 Glasses of wine per week    Drug use: Never    Sexual activity: Yes     Partners: Female     E-Cigarette/Vaping    E-Cigarette Use Never User      E-Cigarette/Vaping Substances     Family history non-contributory  Social History:  Marital Status: /Civil Union     Meds/Allergies   I have reviewed home medications with patient personally.  Prior to Admission medications    Medication Sig Start Date End Date Taking? Authorizing Provider   aspirin (ECOTRIN) 325 mg EC tablet Take 325 mg by mouth every 6 (six) hours as needed for mild pain    Historical Provider, MD   atorvastatin (Lipitor) 20 mg tablet Take 1 tablet by mouth daily at bedtime 10/16/20   Historical Provider, MD   cholecalciferol (VITAMIN D3) 1,000 units tablet Take 1,000 Units by mouth daily    Historical Provider, MD   cyanocobalamin (VITAMIN B-12) 100 mcg tablet Take by mouth daily    Historical Provider, MD   losartan (COZAAR) 25 mg tablet Take 25 mg by mouth 2 (two) times a day    Historical Provider, MD   omeprazole (PriLOSEC) 20 mg delayed release capsule Take 20 mg by mouth daily    Historical Provider, MD   ondansetron (ZOFRAN) 4 mg tablet Take 1 tablet (4 mg total) by mouth every 8 (eight) hours as needed for nausea or vomiting  3/20/21   Zonia Escalante PA-C   Silodosin (Rapaflo) 8 MG CAPS Take by mouth    Historical Provider, MD   tadalafil (CIALIS) 5 MG tablet Take 5 mg by mouth daily as needed for erectile dysfunction    Historical Provider, MD     Allergies   Allergen Reactions    Lisinopril Angioedema       Objective :  Temp:  [97.3 °F (36.3 °C)-98.3 °F (36.8 °C)] 98 °F (36.7 °C)  HR:  [56-74] 64  BP: (135-204)/() 198/91  Resp:  [18-64] 64  SpO2:  [95 %-99 %] 97 %  O2 Device: None (Room air)    Physical Exam  Vitals and nursing note reviewed.   Constitutional:       General: He is not in acute distress.     Appearance: Normal appearance. He is not ill-appearing, toxic-appearing or diaphoretic.   HENT:      Head: Normocephalic and atraumatic.      Right Ear: External ear normal.      Left Ear: External ear normal.   Cardiovascular:      Rate and Rhythm: Normal rate.      Pulses: Normal pulses.      Heart sounds: Normal heart sounds. No murmur heard.     No gallop.   Pulmonary:      Effort: Pulmonary effort is normal. No respiratory distress.      Breath sounds: No wheezing, rhonchi or rales.   Musculoskeletal:         General: Normal range of motion.      Cervical back: Normal range of motion.   Skin:     General: Skin is warm.   Neurological:      Mental Status: He is alert and oriented to person, place, and time. Mental status is at baseline.      Cranial Nerves: No cranial nerve deficit.      Motor: No weakness.   Psychiatric:         Mood and Affect: Mood normal.         Behavior: Behavior normal.         Thought Content: Thought content normal.         Judgment: Judgment normal.                    Lab Results: I have reviewed the following results:  Results from last 7 days   Lab Units 02/15/25  0443   WBC Thousand/uL 3.82*   HEMOGLOBIN g/dL 11.7*   HEMATOCRIT % 36.4*   PLATELETS Thousands/uL 208   SEGS PCT % 60   LYMPHO PCT % 25   MONO PCT % 12   EOS PCT % 1     Results from last 7 days   Lab Units 02/15/25  0443   SODIUM  mmol/L 139   POTASSIUM mmol/L 4.1   CHLORIDE mmol/L 108   CO2 mmol/L 24   BUN mg/dL 22   CREATININE mg/dL 1.09   ANION GAP mmol/L 7   CALCIUM mg/dL 8.8   ALBUMIN g/dL 3.9   TOTAL BILIRUBIN mg/dL 0.33   ALK PHOS U/L 59   ALT U/L 9   AST U/L 14   GLUCOSE RANDOM mg/dL 122     Results from last 7 days   Lab Units 02/15/25  0522   INR  0.96         Lab Results   Component Value Date    HGBA1C 6.2 (H) 04/06/2024           Imaging Results Review: I reviewed radiology reports from this admission including: CT chest.  Other Study Results Review: EKG was reviewed.     Administrative Statements       ** Please Note: This note has been constructed using a voice recognition system. **

## 2025-02-16 LAB
ATRIAL RATE: 60 BPM
ATRIAL RATE: 64 BPM
ATRIAL RATE: 71 BPM
P AXIS: 5 DEGREES
P AXIS: 63 DEGREES
P AXIS: 8 DEGREES
PR INTERVAL: 118 MS
PR INTERVAL: 120 MS
PR INTERVAL: 124 MS
QRS AXIS: -10 DEGREES
QRS AXIS: 1 DEGREES
QRS AXIS: 6 DEGREES
QRSD INTERVAL: 122 MS
QRSD INTERVAL: 126 MS
QRSD INTERVAL: 130 MS
QT INTERVAL: 434 MS
QT INTERVAL: 448 MS
QT INTERVAL: 458 MS
QTC INTERVAL: 448 MS
QTC INTERVAL: 471 MS
QTC INTERVAL: 472 MS
T WAVE AXIS: 14 DEGREES
T WAVE AXIS: 19 DEGREES
T WAVE AXIS: 4 DEGREES
VENTRICULAR RATE: 60 BPM
VENTRICULAR RATE: 64 BPM
VENTRICULAR RATE: 71 BPM

## 2025-02-16 PROCEDURE — 93010 ELECTROCARDIOGRAM REPORT: CPT | Performed by: INTERNAL MEDICINE

## 2025-05-18 ENCOUNTER — HOSPITAL ENCOUNTER (EMERGENCY)
Facility: HOSPITAL | Age: 85
Discharge: HOME | End: 2025-05-18
Attending: EMERGENCY MEDICINE
Payer: MEDICARE

## 2025-05-18 VITALS
SYSTOLIC BLOOD PRESSURE: 170 MMHG | RESPIRATION RATE: 16 BRPM | BODY MASS INDEX: 28.49 KG/M2 | OXYGEN SATURATION: 97 % | HEART RATE: 58 BPM | TEMPERATURE: 97.8 F | WEIGHT: 181.88 LBS | DIASTOLIC BLOOD PRESSURE: 79 MMHG

## 2025-05-18 DIAGNOSIS — R53.1 WEAKNESS: Primary | ICD-10-CM

## 2025-05-18 DIAGNOSIS — R53.83 FATIGUE: ICD-10-CM

## 2025-05-18 DIAGNOSIS — E83.42 HYPOMAGNESEMIA: ICD-10-CM

## 2025-05-18 LAB
2HR DELTA HS TROPONIN: 1 NG/L
ALBUMIN SERPL BCG-MCNC: 4.3 G/DL (ref 3.5–5)
ALP SERPL-CCNC: 75 U/L (ref 34–104)
ALT SERPL W P-5'-P-CCNC: 13 U/L (ref 7–52)
ANION GAP SERPL CALCULATED.3IONS-SCNC: 7 MMOL/L (ref 4–13)
AST SERPL W P-5'-P-CCNC: 13 U/L (ref 13–39)
BASOPHILS # BLD AUTO: 0.01 THOUSANDS/ÂΜL (ref 0–0.1)
BASOPHILS NFR BLD AUTO: 0 % (ref 0–1)
BILIRUB SERPL-MCNC: 0.57 MG/DL (ref 0.2–1)
BILIRUB UR QL STRIP: NEGATIVE
BUN SERPL-MCNC: 21 MG/DL (ref 5–25)
CALCIUM SERPL-MCNC: 9.4 MG/DL (ref 8.4–10.2)
CARDIAC TROPONIN I PNL SERPL HS: 16 NG/L (ref ?–50)
CARDIAC TROPONIN I PNL SERPL HS: 17 NG/L (ref ?–50)
CHLORIDE SERPL-SCNC: 104 MMOL/L (ref 96–108)
CLARITY UR: CLEAR
CO2 SERPL-SCNC: 27 MMOL/L (ref 21–32)
COLOR UR: YELLOW
CREAT SERPL-MCNC: 1.27 MG/DL (ref 0.6–1.3)
EOSINOPHIL # BLD AUTO: 0.07 THOUSAND/ÂΜL (ref 0–0.61)
EOSINOPHIL NFR BLD AUTO: 2 % (ref 0–6)
ERYTHROCYTE [DISTWIDTH] IN BLOOD BY AUTOMATED COUNT: 13.7 % (ref 11.6–15.1)
GFR SERPL CREATININE-BSD FRML MDRD: 52 ML/MIN/1.73SQ M
GLUCOSE SERPL-MCNC: 137 MG/DL (ref 65–140)
GLUCOSE UR STRIP-MCNC: NEGATIVE MG/DL
HCT VFR BLD AUTO: 38.8 % (ref 36.5–49.3)
HGB BLD-MCNC: 12.8 G/DL (ref 12–17)
HGB UR QL STRIP.AUTO: NEGATIVE
IMM GRANULOCYTES # BLD AUTO: 0.03 THOUSAND/UL (ref 0–0.2)
IMM GRANULOCYTES NFR BLD AUTO: 1 % (ref 0–2)
KETONES UR STRIP-MCNC: NEGATIVE MG/DL
LEUKOCYTE ESTERASE UR QL STRIP: NEGATIVE
LIPASE SERPL-CCNC: 10 U/L (ref 11–82)
LYMPHOCYTES # BLD AUTO: 1.08 THOUSANDS/ÂΜL (ref 0.6–4.47)
LYMPHOCYTES NFR BLD AUTO: 28 % (ref 14–44)
MAGNESIUM SERPL-MCNC: 1.7 MG/DL (ref 1.9–2.7)
MCH RBC QN AUTO: 30.1 PG (ref 26.8–34.3)
MCHC RBC AUTO-ENTMCNC: 33 G/DL (ref 31.4–37.4)
MCV RBC AUTO: 91 FL (ref 82–98)
MONOCYTES # BLD AUTO: 0.35 THOUSAND/ÂΜL (ref 0.17–1.22)
MONOCYTES NFR BLD AUTO: 9 % (ref 4–12)
NEUTROPHILS # BLD AUTO: 2.38 THOUSANDS/ÂΜL (ref 1.85–7.62)
NEUTS SEG NFR BLD AUTO: 60 % (ref 43–75)
NITRITE UR QL STRIP: NEGATIVE
NRBC BLD AUTO-RTO: 0 /100 WBCS
PH UR STRIP.AUTO: 6.5 [PH]
PLATELET # BLD AUTO: 205 THOUSANDS/UL (ref 149–390)
PMV BLD AUTO: 10.5 FL (ref 8.9–12.7)
POTASSIUM SERPL-SCNC: 4.2 MMOL/L (ref 3.5–5.3)
PROT SERPL-MCNC: 6.7 G/DL (ref 6.4–8.4)
PROT UR STRIP-MCNC: NEGATIVE MG/DL
RBC # BLD AUTO: 4.25 MILLION/UL (ref 3.88–5.62)
SODIUM SERPL-SCNC: 138 MMOL/L (ref 135–147)
SP GR UR STRIP.AUTO: 1.02 (ref 1–1.03)
T4 FREE SERPL-MCNC: 1.08 NG/DL (ref 0.61–1.12)
TSH SERPL DL<=0.05 MIU/L-ACNC: 0.14 UIU/ML (ref 0.45–4.5)
UROBILINOGEN UR STRIP-ACNC: <2 MG/DL
WBC # BLD AUTO: 3.92 THOUSAND/UL (ref 4.31–10.16)

## 2025-05-18 PROCEDURE — 83735 ASSAY OF MAGNESIUM: CPT | Performed by: EMERGENCY MEDICINE

## 2025-05-18 PROCEDURE — 36415 COLL VENOUS BLD VENIPUNCTURE: CPT | Performed by: EMERGENCY MEDICINE

## 2025-05-18 PROCEDURE — 83690 ASSAY OF LIPASE: CPT | Performed by: EMERGENCY MEDICINE

## 2025-05-18 PROCEDURE — 80053 COMPREHEN METABOLIC PANEL: CPT | Performed by: EMERGENCY MEDICINE

## 2025-05-18 PROCEDURE — 96366 THER/PROPH/DIAG IV INF ADDON: CPT

## 2025-05-18 PROCEDURE — 96368 THER/DIAG CONCURRENT INF: CPT

## 2025-05-18 PROCEDURE — 96365 THER/PROPH/DIAG IV INF INIT: CPT

## 2025-05-18 PROCEDURE — 81003 URINALYSIS AUTO W/O SCOPE: CPT | Performed by: EMERGENCY MEDICINE

## 2025-05-18 PROCEDURE — 93005 ELECTROCARDIOGRAM TRACING: CPT

## 2025-05-18 PROCEDURE — 84484 ASSAY OF TROPONIN QUANT: CPT | Performed by: EMERGENCY MEDICINE

## 2025-05-18 PROCEDURE — 99285 EMERGENCY DEPT VISIT HI MDM: CPT | Performed by: EMERGENCY MEDICINE

## 2025-05-18 PROCEDURE — 99284 EMERGENCY DEPT VISIT MOD MDM: CPT

## 2025-05-18 PROCEDURE — 85025 COMPLETE CBC W/AUTO DIFF WBC: CPT | Performed by: EMERGENCY MEDICINE

## 2025-05-18 PROCEDURE — 84439 ASSAY OF FREE THYROXINE: CPT | Performed by: EMERGENCY MEDICINE

## 2025-05-18 PROCEDURE — 84443 ASSAY THYROID STIM HORMONE: CPT | Performed by: EMERGENCY MEDICINE

## 2025-05-18 RX ORDER — MAGNESIUM SULFATE HEPTAHYDRATE 40 MG/ML
2 INJECTION, SOLUTION INTRAVENOUS ONCE
Status: COMPLETED | OUTPATIENT
Start: 2025-05-18 | End: 2025-05-18

## 2025-05-18 RX ORDER — CLOPIDOGREL BISULFATE 75 MG/1
75 TABLET ORAL DAILY
COMMUNITY

## 2025-05-18 RX ORDER — SODIUM CHLORIDE, SODIUM GLUCONATE, SODIUM ACETATE, POTASSIUM CHLORIDE, MAGNESIUM CHLORIDE, SODIUM PHOSPHATE, DIBASIC, AND POTASSIUM PHOSPHATE .53; .5; .37; .037; .03; .012; .00082 G/100ML; G/100ML; G/100ML; G/100ML; G/100ML; G/100ML; G/100ML
150 INJECTION, SOLUTION INTRAVENOUS CONTINUOUS
Status: DISCONTINUED | OUTPATIENT
Start: 2025-05-18 | End: 2025-05-18 | Stop reason: HOSPADM

## 2025-05-18 RX ADMIN — MAGNESIUM SULFATE HEPTAHYDRATE 2 G: 40 INJECTION, SOLUTION INTRAVENOUS at 10:38

## 2025-05-18 RX ADMIN — SODIUM CHLORIDE, SODIUM GLUCONATE, SODIUM ACETATE, POTASSIUM CHLORIDE, MAGNESIUM CHLORIDE, SODIUM PHOSPHATE, DIBASIC, AND POTASSIUM PHOSPHATE 150 ML/HR: .53; .5; .37; .037; .03; .012; .00082 INJECTION, SOLUTION INTRAVENOUS at 09:14

## 2025-05-18 NOTE — ED PROVIDER NOTES
Time reflects when diagnosis was documented in both MDM as applicable and the Disposition within this note       Time User Action Codes Description Comment    5/18/2025 12:28 PM Mayito Cardona Add [R53.1] Weakness     5/18/2025 12:28 PM Mayito Cardona Add [R53.83] Fatigue     5/18/2025 12:28 PM Mayito Cardona Add [E83.42] Hypomagnesemia           ED Disposition       ED Disposition   Discharge    Condition   Stable    Date/Time   Sun May 18, 2025 12:28 PM    Comment   Felipe Humberto discharge to home/self care.                   Assessment & Plan       Medical Decision Making  Patient is an 84-year-old male presenting with generalized weakness and fatigue x 2 days.  Denies chest pain or shortness of breath.  Has had normal p.o. intake and normal appetite.  Just states that something feels off.     Differential diagnosis includes sepsis, infectious, acute coronary syndrome, orthostatic hypotension, medication interactions, viral syndrome, electrolyte abnormalities, hyperglycemia, hypothyroidism.    Problems Addressed:  Fatigue: acute illness or injury  Hypomagnesemia: acute illness or injury  Weakness: acute illness or injury    Amount and/or Complexity of Data Reviewed  Independent Historian: spouse  External Data Reviewed: labs, radiology and notes.  Labs: ordered. Decision-making details documented in ED Course.    Risk  OTC drugs.  Prescription drug management.  Decision regarding hospitalization.  Risk Details: Patient feels better IV fluids and IV magnesium replacement.  Reviewed labs with patient and spouse at bedside.  Patient is ambulatory upon discharge difficulty.  Reviewed return precautions and follow-up information with patient and spouse.  No evidence for acute coronary syndrome.  No evidence for sepsis, severe sepsis or septic shock.             Medications   magnesium sulfate 2 g/50 mL IVPB (premix) 2 g (0 g Intravenous Stopped 5/18/25 1235)       ED Risk Strat Scores        I personally  "discussed return precautions with this patient and family. I provided the patient with written discharge instructions and particularly highlighted specific areas of interest to this patient, including but not limited to: medications for symptom managment, follow up recommendations, and return precautions. Patient and family are in agreement with this plan as outlined above.            No data recorded        SBIRT 20yo+      Flowsheet Row Most Recent Value   Initial Alcohol Screen: US AUDIT-C     1. How often do you have a drink containing alcohol? 0 Filed at: 05/18/2025 0939   2. How many drinks containing alcohol do you have on a typical day you are drinking?  0 Filed at: 05/18/2025 0939   3a. Male UNDER 65: How often do you have five or more drinks on one occasion? 0 Filed at: 05/18/2025 0939   3b. FEMALE Any Age, or MALE 65+: How often do you have 4 or more drinks on one occassion? 0 Filed at: 05/18/2025 0939   Audit-C Score 0 Filed at: 05/18/2025 0939   GODWIN: How many times in the past year have you...    Used an illegal drug or used a prescription medication for non-medical reasons? Never Filed at: 05/18/2025 0939                            History of Present Illness       Chief Complaint   Patient presents with    Weakness - Generalized     Complains of feeling weak and fatigued since yesterday. States \"I just don't feel right\"        Past Medical History:   Diagnosis Date    Coronary artery disease     Hypertension     Kidney stones       Past Surgical History:   Procedure Laterality Date    CARDIAC SURGERY      CHOLECYSTECTOMY      COLONOSCOPY  06/24/2002    Dr Zamora - negative    COLONOSCOPY  06/24/1999    Dr Zamora - benign polyp removed, diverticulosis, hypertrophied anal papilla    COLONOSCOPY  03/19/2007    Dr Zamora - Diverticulosis    COLONOSCOPY  04/30/2012    Dr Zamora - diverticulosis    COLONOSCOPY  03/27/2017    Dr Hughes - prostate nodule and increased firmaness of the prostate. 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    FL RETROGRADE PYELOGRAM  03/20/2021    IL CYSTOURETHROSCOPY W/URETERAL CATHETERIZATION Left 03/20/2021    Procedure: CYSTOSCOPY RETROGRADE PYELOGRAM WITH INSERTION STENT URETERAL;  Surgeon: Kendall Alcala MD;  Location: [facility];  Service: Urology    TONSILLECTOMY        History reviewed. No pertinent family history.   Social History[1]   E-Cigarette/Vaping    E-Cigarette Use Never User       E-Cigarette/Vaping Substances      I have reviewed and agree with the history as documented.     Patient is an 84-year-old male presenting with generalized weakness and fatigue x 2 days.  Denies chest pain or shortness of breath.  Has had normal p.o. intake and normal appetite.  Just states that something feels off.  Denies fevers or chills.  No focal motor or sensory deficits noted.  Denies headache.  Denies any syncopal events.  States occasional near syncope upon standing.  Patient is here with his spouse.  Denies any neck pain or rash.          Review of Systems   Constitutional:  Positive for fatigue. Negative for activity change, appetite change, chills and fever.   HENT:  Negative for ear pain, hearing loss, rhinorrhea, sneezing, sore throat and trouble swallowing.    Eyes:  Negative for pain and visual disturbance.   Respiratory:  Negative for cough, choking, chest tightness, shortness of breath, wheezing and stridor.    Cardiovascular:  Negative for chest pain, palpitations  and leg swelling.   Gastrointestinal:  Negative for abdominal pain, constipation, diarrhea, nausea and vomiting.   Genitourinary:  Negative for difficulty urinating, dysuria, frequency, hematuria and testicular pain.   Musculoskeletal:  Negative for arthralgias, back pain, gait problem and neck pain.   Skin:  Negative for color change and rash.   Allergic/Immunologic: Negative for immunocompromised state.   Neurological:  Positive for weakness. Negative for dizziness, seizures, syncope, speech difficulty, light-headedness, numbness and headaches.   All other systems reviewed and are negative.          Objective       ED Triage Vitals [05/18/25 0847]   Temperature Pulse Blood Pressure Respirations SpO2 Patient Position - Orthostatic VS   97.6 °F (36.4 °C) 65 (!) 184/74 18 97 % Lying      Temp Source Heart Rate Source BP Location FiO2 (%) Pain Score    Temporal Monitor Left arm -- No Pain      Vitals      Date and Time Temp Pulse SpO2 Resp BP Pain Score FACES Pain Rating User   05/18/25 1230 97.8 °F (36.6 °C) 58 97 % 16 170/79 No Pain -- [place]   05/18/25 1130 97.6 °F (36.4 °C) 55 93 % 16 146/71 No Pain -- [place]   05/18/25 1030 97.5 °F (36.4 °C) 53 96 % 18 164/71 No Pain -- [place]   05/18/25 0940 -- -- -- -- -- No Pain -- [place]   05/18/25 0847 97.6 °F (36.4 °C) 65 97 % 18 184/74 No Pain -- [place]            Physical Exam  Vitals and nursing note reviewed.   Constitutional:       General: He is not in acute distress.     Appearance: Normal appearance. He is well-developed and normal weight. He is not ill-appearing, toxic-appearing or diaphoretic.   HENT:      Head: Normocephalic and atraumatic.      Nose: Nose normal.      Mouth/Throat:      Mouth: Mucous membranes are moist.      Pharynx: Oropharynx is clear.     Eyes:      General: No scleral icterus.     Extraocular Movements: Extraocular movements intact.      Conjunctiva/sclera: Conjunctivae normal.       Cardiovascular:      Rate and Rhythm: Normal rate and regular rhythm.       "Pulses: Normal pulses.      Heart sounds: Normal heart sounds. No murmur heard.  Pulmonary:      Effort: Pulmonary effort is normal. No respiratory distress.      Breath sounds: Normal breath sounds. No wheezing or rales.   Chest:      Chest wall: No tenderness.   Abdominal:      General: Bowel sounds are normal. There is no distension.      Palpations: Abdomen is soft. There is no mass.      Tenderness: There is no abdominal tenderness. There is no right CVA tenderness, left CVA tenderness, guarding or rebound.     Musculoskeletal:         General: No tenderness, deformity or signs of injury. Normal range of motion.      Cervical back: Normal range of motion and neck supple. No rigidity or tenderness.      Right lower leg: No edema.      Left lower leg: No edema.   Lymphadenopathy:      Cervical: No cervical adenopathy.     Skin:     General: Skin is warm and dry.      Capillary Refill: Capillary refill takes less than 2 seconds.      Coloration: Skin is not jaundiced or pale.      Findings: No bruising, erythema, lesion or rash.     Neurological:      General: No focal deficit present.      Mental Status: He is alert and oriented to person, place, and time. Mental status is at baseline.      Motor: No abnormal muscle tone.     Psychiatric:         Mood and Affect: Mood normal.         Behavior: Behavior normal.         Results Reviewed       Procedure Component Value Units Date/Time    T4, free [[id number]]  (Normal) Collected: 05/18/25 0914    Lab Status: Final result Specimen: Blood from Arm, Right Updated: 05/18/25 1554     Free T4 1.08 ng/dL     Narrative:        \"Therapeutic range for patients medicated with thyroid disorders: 0.61-1.24 ng/dL.\"    HS Troponin I 2hr [[id number]]  (Normal) Collected: 05/18/25 1158    Lab Status: Final result Specimen: Blood from Arm, Right Updated: 05/18/25 1226     hs TnI 2hr 17 ng/L      Delta 2hr hsTnI 1 ng/L     UA w Reflex to Microscopic w Reflex to Culture [[id number]] " Collected: 05/18/25 1012    Lab Status: Final result Specimen: Urine, Clean Catch Updated: 05/18/25 1019     Color, UA Yellow     Clarity, UA Clear     Specific Gravity, UA 1.020     pH, UA 6.5     Leukocytes, UA Negative     Nitrite, UA Negative     Protein, UA Negative mg/dl      Glucose, UA Negative mg/dl      Ketones, UA Negative mg/dl      Urobilinogen, UA <2.0 mg/dl      Bilirubin, UA Negative     Occult Blood, UA Negative    TSH, 3rd generation with Free T4 reflex [[id number]]  (Abnormal) Collected: 05/18/25 0914    Lab Status: Final result Specimen: Blood from Arm, Right Updated: 05/18/25 0950     TSH 3RD GENERATON 0.145 uIU/mL     HS Troponin 0hr (reflex protocol) [[id number]]  (Normal) Collected: 05/18/25 0914    Lab Status: Final result Specimen: Blood from Arm, Right Updated: 05/18/25 0941     hs TnI 0hr 16 ng/L     Comprehensive metabolic panel [[id number]] Collected: 05/18/25 0914    Lab Status: Final result Specimen: Blood from Arm, Right Updated: 05/18/25 0935     Sodium 138 mmol/L      Potassium 4.2 mmol/L      Chloride 104 mmol/L      CO2 27 mmol/L      ANION GAP 7 mmol/L      BUN 21 mg/dL      Creatinine 1.27 mg/dL      Glucose 137 mg/dL      Calcium 9.4 mg/dL      AST 13 U/L      ALT 13 U/L      Alkaline Phosphatase 75 U/L      Total Protein 6.7 g/dL      Albumin 4.3 g/dL      Total Bilirubin 0.57 mg/dL      eGFR 52 ml/min/1.73sq m     Narrative:      [employer] guidelines for Chronic Kidney Disease (CKD):     Stage 1 with normal or high GFR (GFR > 90 mL/min/1.73 square meters)    Stage 2 Mild CKD (GFR = 60-89 mL/min/1.73 square meters)    Stage 3A Moderate CKD (GFR = 45-59 mL/min/1.73 square meters)    Stage 3B Moderate CKD (GFR = 30-44 mL/min/1.73 square meters)    Stage 4 Severe CKD (GFR = 15-29 mL/min/1.73 square meters)    Stage 5 End Stage CKD (GFR <15 mL/min/1.73 square meters)  Note: GFR calculation is accurate only with a steady state creatinine    Lipase [[id number]]   (Abnormal) Collected: 05/18/25 0914    Lab Status: Final result Specimen: Blood from Arm, Right Updated: 05/18/25 0935     Lipase 10 u/L     Magnesium [[id number]]  (Abnormal) Collected: 05/18/25 0914    Lab Status: Final result Specimen: Blood from Arm, Right Updated: 05/18/25 0935     Magnesium 1.7 mg/dL     CBC and differential [[id number]]  (Abnormal) Collected: 05/18/25 0914    Lab Status: Final result Specimen: Blood from Arm, Right Updated: 05/18/25 0919     WBC 3.92 Thousand/uL      RBC 4.25 Million/uL      Hemoglobin 12.8 g/dL      Hematocrit 38.8 %      MCV 91 fL      MCH 30.1 pg      MCHC 33.0 g/dL      RDW 13.7 %      MPV 10.5 fL      Platelets 205 Thousands/uL      nRBC 0 /100 WBCs      Segmented % 60 %      Immature Grans % 1 %      Lymphocytes % 28 %      Monocytes % 9 %      Eosinophils Relative 2 %      Basophils Relative 0 %      Absolute Neutrophils 2.38 Thousands/µL      Absolute Immature Grans 0.03 Thousand/uL      Absolute Lymphocytes 1.08 Thousands/µL      Absolute Monocytes 0.35 Thousand/µL      Eosinophils Absolute 0.07 Thousand/µL      Basophils Absolute 0.01 Thousands/µL             No orders to display       ECG 12 Lead Documentation Only    Date/Time: 5/18/2025 9:05 AM    Performed by: Mayito Cardona DO  Authorized by: Mayito Cardona DO    Indications / Diagnosis:  Weakness  ECG reviewed by me, the ED Provider: yes    Patient location:  ED  Previous ECG:     Comparison to cardiac monitor: Yes    Rate:     ECG rate:  62    ECG rate assessment: normal    Rhythm:     Rhythm: sinus rhythm    Ectopy:     Ectopy: none    QRS:     QRS axis:  Right    QRS intervals:  Wide  Conduction:     Conduction: abnormal      Abnormal conduction: complete RBBB    ST segments:     ST segments:  Normal  T waves:     T waves: non-specific        ED Medication and Procedure Management   Prior to Admission Medications   Prescriptions Last Dose Informant Patient Reported? Taking?   Silodosin  (Rapaflo) 8 MG CAPS 5/17/2025 Evening  Yes Yes   Sig: Take by mouth   aspirin (ECOTRIN) 325 mg EC tablet 5/17/2025 Evening  Yes Yes   Sig: Take 325 mg by mouth every 6 (six) hours as needed for mild pain   atorvastatin (Lipitor) 20 mg tablet 5/17/2025 Evening  Yes Yes   Sig: Take 1 tablet by mouth daily at bedtime   cholecalciferol (VITAMIN D3) 1,000 units tablet 5/18/2025 Morning  Yes Yes   Sig: Take 1,000 Units by mouth in the morning.   clopidogrel (PLAVIX) 75 mg tablet 5/17/2025 Morning Self Yes Yes   Sig: Take 75 mg by mouth daily   cyanocobalamin (VITAMIN B-12) 100 mcg tablet 5/18/2025 Morning  Yes Yes   Sig: Take by mouth in the morning.   losartan (COZAAR) 25 mg tablet 5/18/2025 Morning  Yes Yes   Sig: Take 25 mg by mouth in the morning and 25 mg before bedtime.   omeprazole (PriLOSEC) 20 mg delayed release capsule 5/18/2025 Morning  Yes Yes   Sig: Take 20 mg by mouth in the morning.   ondansetron (ZOFRAN) 4 mg tablet Not Taking  No No   Sig: Take 1 tablet (4 mg total) by mouth every 8 (eight) hours as needed for nausea or vomiting   Patient not taking: Reported on 5/18/2025   tadalafil (CIALIS) 5 MG tablet Not Taking  Yes No   Sig: Take 5 mg by mouth daily as needed for erectile dysfunction   Patient not taking: Reported on 5/18/2025      Facility-Administered Medications: None     Discharge Medication List as of 5/18/2025 12:29 PM        CONTINUE these medications which have NOT CHANGED    Details   aspirin (ECOTRIN) 325 mg EC tablet Take 325 mg by mouth every 6 (six) hours as needed for mild pain, Historical Med      atorvastatin (Lipitor) 20 mg tablet Take 1 tablet by mouth daily at bedtime, Starting Fri 10/16/2020, Historical Med      cholecalciferol (VITAMIN D3) 1,000 units tablet Take 1,000 Units by mouth in the morning., Historical Med      clopidogrel (PLAVIX) 75 mg tablet Take 75 mg by mouth daily, Historical Med      cyanocobalamin (VITAMIN B-12) 100 mcg tablet Take by mouth in the morning.,  Historical Med      losartan (COZAAR) 25 mg tablet Take 25 mg by mouth in the morning and 25 mg before bedtime., Historical Med      omeprazole (PriLOSEC) 20 mg delayed release capsule Take 20 mg by mouth in the morning., Historical Med      Silodosin (Rapaflo) 8 MG CAPS Take by mouth, Historical Med      ondansetron (ZOFRAN) 4 mg tablet Take 1 tablet (4 mg total) by mouth every 8 (eight) hours as needed for nausea or vomiting, Starting Sat 3/20/2021, Normal      tadalafil (CIALIS) 5 MG tablet Take 5 mg by mouth daily as needed for erectile dysfunction, Historical Med           No discharge procedures on file.  ED SEPSIS DOCUMENTATION   Time reflects when diagnosis was documented in both MDM as applicable and the Disposition within this note       Time User Action Codes Description Comment    5/18/2025 12:28 PM Mayito Cardona Add [R53.1] Weakness     5/18/2025 12:28 PM Mayito Cardona Add [R53.83] Fatigue     5/18/2025 12:28 PM Mayito Cardona Add [E83.42] Hypomagnesemia                      [1]   Social History  Tobacco Use    Smoking status: Never    Smokeless tobacco: Never   Vaping Use    Vaping status: Never Used   Substance Use Topics    Alcohol use: Yes     Alcohol/week: 4.0 standard drinks of alcohol     Types: 4 Glasses of wine per week    Drug use: Never        Mayito Cardona DO  05/21/25 5209

## 2025-05-19 LAB
ATRIAL RATE: 63 BPM
P AXIS: 44 DEGREES
PR INTERVAL: 130 MS
QRS AXIS: 28 DEGREES
QRSD INTERVAL: 126 MS
QT INTERVAL: 436 MS
QTC INTERVAL: 446 MS
T WAVE AXIS: 26 DEGREES
VENTRICULAR RATE: 63 BPM

## 2025-05-19 PROCEDURE — 93010 ELECTROCARDIOGRAM REPORT: CPT | Performed by: INTERNAL MEDICINE

## 2025-06-02 ENCOUNTER — OFFICE VISIT (OUTPATIENT)
Dept: URGENT CARE | Facility: CLINIC | Age: 85
End: 2025-06-02
Payer: MEDICARE

## 2025-06-02 ENCOUNTER — APPOINTMENT (OUTPATIENT)
Dept: RADIOLOGY | Facility: CLINIC | Age: 85
End: 2025-06-02
Payer: MEDICARE

## 2025-06-02 VITALS
TEMPERATURE: 96.2 F | HEART RATE: 66 BPM | OXYGEN SATURATION: 98 % | RESPIRATION RATE: 17 BRPM | DIASTOLIC BLOOD PRESSURE: 58 MMHG | SYSTOLIC BLOOD PRESSURE: 122 MMHG

## 2025-06-02 DIAGNOSIS — M10.9 ACUTE GOUT OF RIGHT ANKLE, UNSPECIFIED CAUSE: Primary | ICD-10-CM

## 2025-06-02 DIAGNOSIS — M25.571 PAIN IN JOINT, ANKLE AND FOOT, RIGHT: ICD-10-CM

## 2025-06-02 PROCEDURE — G0463 HOSPITAL OUTPT CLINIC VISIT: HCPCS

## 2025-06-02 PROCEDURE — 73630 X-RAY EXAM OF FOOT: CPT

## 2025-06-02 PROCEDURE — 73610 X-RAY EXAM OF ANKLE: CPT

## 2025-06-02 PROCEDURE — 99203 OFFICE O/P NEW LOW 30 MIN: CPT

## 2025-06-02 RX ORDER — AMLODIPINE BESYLATE 5 MG/1
5 TABLET ORAL DAILY
COMMUNITY
Start: 2025-04-10 | End: 2026-04-10

## 2025-06-02 RX ORDER — ATORVASTATIN CALCIUM 40 MG
40 TABLET ORAL EVERY EVENING
COMMUNITY
Start: 2025-05-27

## 2025-06-02 RX ORDER — LOSARTAN POTASSIUM 50 MG/1
1 TABLET ORAL 2 TIMES DAILY
COMMUNITY
Start: 2025-04-07

## 2025-06-02 RX ORDER — PREDNISONE 10 MG/1
TABLET ORAL
Qty: 18 TABLET | Refills: 0 | Status: SHIPPED | OUTPATIENT
Start: 2025-06-02 | End: 2025-06-08

## 2025-06-02 RX ORDER — CLOPIDOGREL BISULFATE 75 MG/1
75 TABLET ORAL DAILY
COMMUNITY
Start: 2025-02-20

## 2025-06-02 RX ORDER — POTASSIUM CITRATE 15 MEQ/1
1 TABLET, EXTENDED RELEASE ORAL 2 TIMES DAILY WITH MEALS
COMMUNITY

## 2025-06-02 RX ORDER — NITROGLYCERIN 0.4 MG/1
0.4 TABLET SUBLINGUAL
COMMUNITY
Start: 2025-02-22 | End: 2026-02-22

## 2025-06-02 NOTE — PROGRESS NOTES
[facility]        NAME: Felipe Paul is a 84 y.o. male  : 1940    MRN: [id number]  DATE: 2025  TIME: 12:05 PM    Assessment and Plan   Acute gout of right ankle, unspecified cause [M10.9]  1. Acute gout of right ankle, unspecified cause  XR ankle 3+ vw right    XR foot 3+ vw right    predniSONE 10 mg tablet        Provider Radiology Interpretation (preliminary)   Final results will be as per official Radiology Report when available:   RIGHT FOOT AND ANKLE: negative     Tender to palpation inner right ankle area. Cap refill less than 2 seconds. Skin pink, warm and dry. ROM and strength WNL. No acute osseous abnormality. Pt denies overuse of excess walking prior to pain beginning. Pt reports the pain feels better on awakening in the AM and then once he is out of bed and walking the pain becomes worse. He is not taking any medication for pain, is not applying heat or ice and is not applying any topical pain agents to alleviate the pain.     Patient Instructions       Follow up with PCP in 3-5 days.  Proceed to  ER if symptoms worsen.    If tests are performed, our office will contact you with results only if changes need to made to the care plan discussed with you at the visit. [facility]    Chief Complaint     Chief Complaint   Patient presents with    Right Foot Pain/Swelling     Started 1 week ago.   No traumatic events noted.  Pain in ankle and upper top foot, slight swelling noted. Has burning sensation when pain gets worse.           History of Present Illness       84-year-old male with significant past medical history presents to this clinic with complaint of right foot pain and swelling.  Patient reports symptoms started 1 week prior to arrival.  Denies any trauma or injury.  Patient is reporting pain in the right ankle in the upper portion of his foot.  He reports there is slight swelling and there is a burning sensation when the pain  worsens.      Review of Systems   Review of Systems   Musculoskeletal:  Positive for arthralgias and joint swelling.         Current Medications     Current Medications[1]    Current Allergies     Allergies as of 06/02/2025 - Reviewed 06/02/2025   Allergen Reaction Noted    Lisinopril Angioedema, Edema, and Swelling 05/04/2018    Horse protein Other (See Comments) 01/28/2003    Bee venom Hives 01/12/2015    Tetanus immune globulin Other (See Comments) 10/24/2017            The following portions of the patient's history were reviewed and updated as appropriate: allergies, current medications, past family history, past medical history, past social history, past surgical history and problem list.     Past Medical History[2]    Past Surgical History[3]    Family History[4]      Medications have been verified.        Objective   /58   Pulse 66   Temp (!) 96.2 °F (35.7 °C)   Resp 17   SpO2 98%        Physical Exam     Physical Exam  Vitals and nursing note reviewed.   Constitutional:       General: He is not in acute distress.     Appearance: Normal appearance. He is obese. He is not ill-appearing.   HENT:      Head: Normocephalic and atraumatic.     Cardiovascular:      Rate and Rhythm: Normal rate and regular rhythm.      Pulses: Normal pulses.      Heart sounds: Normal heart sounds.   Pulmonary:      Effort: Pulmonary effort is normal.      Breath sounds: Normal breath sounds.     Musculoskeletal:         General: Swelling and tenderness present.        Feet:       Comments: Ankle swelling (medial ankle); tender to palpation below medial malleolus; slightly limited ROM with plantar flexion; cap refill less than 2 secs; skin pink, warm and dry; no excessive redness or discoloration noted.      Skin:     Capillary Refill: Capillary refill takes less than 2 seconds.     Neurological:      General: No focal deficit present.      Mental Status: He is alert and oriented to person, place, and time.                         [1]   Current Outpatient Medications:     amLODIPine (NORVASC) 5 mg tablet, Take 5 mg by mouth daily, Disp: , Rfl:     aspirin (ECOTRIN) 325 mg EC tablet, Take 325 mg by mouth every 6 (six) hours as needed for mild pain (Patient taking differently: Take 81 mg by mouth every 6 (six) hours as needed for mild pain), Disp: , Rfl:     atorvastatin (Lipitor) 20 mg tablet, Take 40 mg by mouth daily at bedtime, Disp: , Rfl:     cholecalciferol (VITAMIN D3) 1,000 units tablet, Take 1,000 Units by mouth in the morning., Disp: , Rfl:     clopidogrel (PLAVIX) 75 mg tablet, Take 75 mg by mouth daily, Disp: , Rfl:     cyanocobalamin (VITAMIN B-12) 100 mcg tablet, Take by mouth in the morning., Disp: , Rfl:     Lipitor 40 MG tablet, Take 40 mg by mouth every evening, Disp: , Rfl:     losartan (COZAAR) 50 mg tablet, Take 1 tablet by mouth in the morning and 1 tablet before bedtime., Disp: , Rfl:     nitroglycerin (NITROSTAT) 0.4 mg SL tablet, Place 0.4 mg under the tongue, Disp: , Rfl:     Potassium Citrate ER 15 MEQ (1620 MG) TBCR, Take 1 tablet by mouth 2 (two) times a day with meals, Disp: , Rfl:     predniSONE 10 mg tablet, Take 4 tablets (40 mg total) by mouth daily for 2 days, THEN 3 tablets (30 mg total) daily for 2 days, THEN 2 tablets (20 mg total) daily for 2 days., Disp: 18 tablet, Rfl: 0    clopidogrel (PLAVIX) 75 mg tablet, Take 75 mg by mouth daily (Patient not taking: Reported on 6/2/2025), Disp: , Rfl:     losartan (COZAAR) 25 mg tablet, Take 25 mg by mouth in the morning and 25 mg before bedtime. (Patient not taking: Reported on 6/2/2025), Disp: , Rfl:     omeprazole (PriLOSEC) 20 mg delayed release capsule, Take 20 mg by mouth in the morning. (Patient not taking: Reported on 6/2/2025), Disp: , Rfl:     ondansetron (ZOFRAN) 4 mg tablet, Take 1 tablet (4 mg total) by mouth every 8 (eight) hours as needed for nausea or vomiting (Patient not taking: Reported on 6/2/2025), Disp: 20 tablet, Rfl:  0    Silodosin (Rapaflo) 8 MG CAPS, Take by mouth (Patient not taking: Reported on 6/2/2025), Disp: , Rfl:     tadalafil (CIALIS) 5 MG tablet, Take 5 mg by mouth daily as needed for erectile dysfunction (Patient not taking: Reported on 6/2/2025), Disp: , Rfl:   [2]   Past Medical History:  Diagnosis Date    Coronary artery disease     Heart attack (HCC)     Hypertension     Kidney stones    [3]   Past Surgical History:  Procedure Laterality Date    CARDIAC SURGERY      CHOLECYSTECTOMY      COLONOSCOPY  06/24/2002    Dr Zamora - negative    COLONOSCOPY  06/24/1999    Dr Zamora - benign polyp removed, diverticulosis, hypertrophied anal papilla    COLONOSCOPY  03/19/2007    Dr Zamora - Diverticulosis    COLONOSCOPY  04/30/2012    Dr Zamora - diverticulosis    COLONOSCOPY  03/27/2017    Dr Hughes - prostate nodule and increased firmaness of the prostate. 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    FL RETROGRADE PYELOGRAM  03/20/2021    NH CYSTOURETHROSCOPY W/URETERAL CATHETERIZATION Left 03/20/2021    Procedure: CYSTOSCOPY RETROGRADE PYELOGRAM WITH INSERTION STENT URETERAL;  Surgeon: Kendall Alcala MD;  Location: [facility];  Service: Urology    TONSILLECTOMY     [4]   Family History  Problem Relation Name Age of Onset    No Known Problems Mother      No Known Problems Father

## 2025-06-02 NOTE — PATIENT INSTRUCTIONS
Your xray report will have a final reading by a radiologist in the next 24 hours. If there is anything abnormal, the staff will be in contact with you regarding an updated plan of care.    Rest and elevate the affected extremity.    Ice the affected area 4-6 times per day for a period of 15-20 minutes each time. DO NOT place an ice pack against your bare skin.    After the first 48-72 hours you may alternate heat and ice or use what is most comfortable for you.    You may use topical pain agent(s) of choice and according to package directions (lidocaine patches, biofreeze, salonpas, icy hot, etc.). PLEASE BE SURE TO REMOVE THE REMNANTS OF THESE ITEMS PRIOR TO USING A HEATING PAD TO THE AREA AS YOU CAN SUSTAIN A BURN TO YOUR SKIN!!    Follow up with orthopedics if pain persists in the ankle region and does not seem to improve in 5-7 days.

## (undated) DEVICE — SPECIMEN CONTAINER STERILE PEEL PACK

## (undated) DEVICE — SYRINGE 10ML LL

## (undated) DEVICE — GLOVE SRG BIOGEL ORTHOPEDIC 7.5

## (undated) DEVICE — PACK TUR

## (undated) DEVICE — GUIDEWIRE STRGHT TIP 0.035 IN  SOLO PLUS